# Patient Record
Sex: MALE | Race: WHITE | NOT HISPANIC OR LATINO | Employment: OTHER | URBAN - METROPOLITAN AREA
[De-identification: names, ages, dates, MRNs, and addresses within clinical notes are randomized per-mention and may not be internally consistent; named-entity substitution may affect disease eponyms.]

---

## 2017-08-02 ENCOUNTER — HISTORICAL (OUTPATIENT)
Dept: RADIOLOGY | Facility: HOSPITAL | Age: 62
End: 2017-08-02

## 2017-08-15 ENCOUNTER — HISTORICAL (OUTPATIENT)
Dept: ADMINISTRATIVE | Facility: HOSPITAL | Age: 62
End: 2017-08-15

## 2017-10-10 ENCOUNTER — HISTORICAL (OUTPATIENT)
Dept: LAB | Facility: HOSPITAL | Age: 62
End: 2017-10-10

## 2017-11-02 ENCOUNTER — HISTORICAL (OUTPATIENT)
Dept: ADMINISTRATIVE | Facility: HOSPITAL | Age: 62
End: 2017-11-02

## 2017-11-02 LAB
ABS NEUT (OLG): 6.35 X10(3)/MCL (ref 2.1–9.2)
ALBUMIN SERPL-MCNC: 3.8 GM/DL (ref 3.4–5)
ALBUMIN/GLOB SERPL: 1.3 RATIO (ref 1.1–2)
ALP SERPL-CCNC: 53 UNIT/L (ref 50–136)
ALT SERPL-CCNC: 38 UNIT/L (ref 12–78)
AST SERPL-CCNC: 21 UNIT/L (ref 15–37)
BASOPHILS # BLD AUTO: 0 X10(3)/MCL (ref 0–0.2)
BASOPHILS NFR BLD AUTO: 0.3 %
BILIRUB SERPL-MCNC: 0.8 MG/DL (ref 0.2–1)
BILIRUBIN DIRECT+TOT PNL SERPL-MCNC: 0.2 MG/DL (ref 0–0.5)
BILIRUBIN DIRECT+TOT PNL SERPL-MCNC: 0.6 MG/DL (ref 0–0.8)
BUN SERPL-MCNC: 13 MG/DL (ref 7–18)
CALCIUM SERPL-MCNC: 9.7 MG/DL (ref 8.5–10.1)
CHLORIDE SERPL-SCNC: 104 MMOL/L (ref 98–107)
CO2 SERPL-SCNC: 26 MMOL/L (ref 21–32)
CREAT SERPL-MCNC: 0.83 MG/DL (ref 0.7–1.3)
EOSINOPHIL # BLD AUTO: 0.1 X10(3)/MCL (ref 0–0.9)
EOSINOPHIL NFR BLD AUTO: 1.3 %
ERYTHROCYTE [DISTWIDTH] IN BLOOD BY AUTOMATED COUNT: 19.6 % (ref 11.5–17)
FERRITIN SERPL-MCNC: 561.7 NG/ML (ref 8–388)
GLOBULIN SER-MCNC: 2.9 GM/DL (ref 2.4–3.5)
GLUCOSE SERPL-MCNC: 99 MG/DL (ref 74–106)
HCT VFR BLD AUTO: 41.8 % (ref 42–52)
HGB BLD-MCNC: 13.7 GM/DL (ref 14–18)
IRON SATN MFR SERPL: 17.9 % (ref 20–50)
IRON SERPL-MCNC: 61 MCG/DL (ref 50–175)
LYMPHOCYTES # BLD AUTO: 3.6 X10(3)/MCL (ref 0.6–4.6)
LYMPHOCYTES NFR BLD AUTO: 32.4 %
MCH RBC QN AUTO: 21.4 PG (ref 27–31)
MCHC RBC AUTO-ENTMCNC: 32.8 GM/DL (ref 33–36)
MCV RBC AUTO: 65.3 FL (ref 80–94)
MONOCYTES # BLD AUTO: 0.9 X10(3)/MCL (ref 0.1–1.3)
MONOCYTES NFR BLD AUTO: 8.2 %
NEUTROPHILS # BLD AUTO: 6.4 X10(3)/MCL (ref 2.1–9.2)
NEUTROPHILS NFR BLD AUTO: 57.8 %
PLATELET # BLD AUTO: 215 X10(3)/MCL (ref 130–400)
PMV BLD AUTO: 11.1 FL (ref 9.4–12.4)
POTASSIUM SERPL-SCNC: 3.9 MMOL/L (ref 3.5–5.1)
PROT SERPL-MCNC: 6.7 GM/DL (ref 6.4–8.2)
RBC # BLD AUTO: 6.4 X10(6)/MCL (ref 4.7–6.1)
SODIUM SERPL-SCNC: 139 MMOL/L (ref 136–145)
TIBC SERPL-MCNC: 341 MCG/DL (ref 250–450)
TRANSFERRIN SERPL-MCNC: 268 MG/DL (ref 200–360)
WBC # SPEC AUTO: 11 X10(3)/MCL (ref 4.5–11.5)

## 2017-11-21 ENCOUNTER — HISTORICAL (OUTPATIENT)
Dept: ADMINISTRATIVE | Facility: HOSPITAL | Age: 62
End: 2017-11-21

## 2017-12-14 ENCOUNTER — HISTORICAL (OUTPATIENT)
Dept: ADMINISTRATIVE | Facility: HOSPITAL | Age: 62
End: 2017-12-14

## 2018-12-19 ENCOUNTER — HISTORICAL (OUTPATIENT)
Dept: ADMINISTRATIVE | Facility: HOSPITAL | Age: 63
End: 2018-12-19

## 2018-12-19 LAB
ABS NEUT (OLG): 6.4 X10(3)/MCL (ref 2.1–9.2)
ALBUMIN SERPL-MCNC: 4 GM/DL (ref 3.4–5)
ALBUMIN/GLOB SERPL: 1.74 {RATIO} (ref 1.5–2.5)
ALP SERPL-CCNC: 46 UNIT/L (ref 38–126)
ALT SERPL-CCNC: 25 UNIT/L (ref 7–52)
AST SERPL-CCNC: 18 UNIT/L (ref 15–37)
BILIRUB SERPL-MCNC: 0.6 MG/DL (ref 0.2–1)
BILIRUBIN DIRECT+TOT PNL SERPL-MCNC: 0.1 MG/DL (ref 0–0.5)
BILIRUBIN DIRECT+TOT PNL SERPL-MCNC: 0.5 MG/DL
BUN SERPL-MCNC: 15 MG/DL (ref 7–18)
CALCIUM SERPL-MCNC: 9.8 MG/DL (ref 8.5–10)
CHLORIDE SERPL-SCNC: 103 MMOL/L (ref 98–107)
CO2 SERPL-SCNC: 26 MMOL/L (ref 21–32)
CREAT SERPL-MCNC: 0.89 MG/DL (ref 0.6–1.3)
ERYTHROCYTE [DISTWIDTH] IN BLOOD BY AUTOMATED COUNT: 16.1 % (ref 11.5–17)
GLOBULIN SER-MCNC: 2.3 GM/DL (ref 1.2–3)
GLUCOSE SERPL-MCNC: 330 MG/DL (ref 74–106)
HCT VFR BLD AUTO: 45.8 % (ref 42–52)
HGB BLD-MCNC: 13.7 GM/DL (ref 14–18)
LYMPHOCYTES # BLD AUTO: 4.6 X10(3)/MCL (ref 0.6–3.4)
LYMPHOCYTES NFR BLD AUTO: 38 % (ref 13–40)
MCH RBC QN AUTO: 21.8 PG (ref 27–31.2)
MCHC RBC AUTO-ENTMCNC: 30 GM/DL (ref 32–36)
MCV RBC AUTO: 73 FL (ref 80–94)
MONOCYTES # BLD AUTO: 1 X10(3)/MCL (ref 0.1–1.3)
MONOCYTES NFR BLD AUTO: 8.7 % (ref 0.1–24)
NEUTROPHILS NFR BLD AUTO: 53.3 % (ref 47–80)
PLATELET # BLD AUTO: 246 X10(3)/MCL (ref 130–400)
PMV BLD AUTO: 10.7 FL (ref 9.4–12.4)
POTASSIUM SERPL-SCNC: 4.2 MMOL/L (ref 3.5–5.1)
PROT SERPL-MCNC: 6.3 GM/DL (ref 6.4–8.2)
PSA SERPL-MCNC: 0 NG/ML (ref 0–4.5)
RBC # BLD AUTO: 6.29 X10(6)/MCL (ref 4.7–6.1)
SODIUM SERPL-SCNC: 135 MMOL/L (ref 136–145)
WBC # SPEC AUTO: 12 X10(3)/MCL (ref 4.5–11.5)

## 2018-12-21 LAB
EST. AVERAGE GLUCOSE BLD GHB EST-MCNC: 151 MG/DL
HBA1C MFR BLD: 6.9 % (ref 4.4–6.4)

## 2020-11-11 ENCOUNTER — HISTORICAL (OUTPATIENT)
Dept: ADMINISTRATIVE | Facility: HOSPITAL | Age: 65
End: 2020-11-11

## 2020-12-09 ENCOUNTER — HISTORICAL (OUTPATIENT)
Dept: ADMINISTRATIVE | Facility: HOSPITAL | Age: 65
End: 2020-12-09

## 2020-12-09 LAB
ABS NEUT (OLG): 5.1 X10(3)/MCL (ref 2.1–9.2)
ALBUMIN SERPL-MCNC: 4.3 GM/DL (ref 3.4–5)
ALBUMIN/GLOB SERPL: 2.05 {RATIO} (ref 1.5–2.5)
ALP SERPL-CCNC: 35 UNIT/L (ref 38–126)
ALT SERPL-CCNC: 19 UNIT/L (ref 7–52)
AST SERPL-CCNC: 18 UNIT/L (ref 15–37)
BILIRUB SERPL-MCNC: 0.6 MG/DL (ref 0.2–1)
BILIRUBIN DIRECT+TOT PNL SERPL-MCNC: 0.2 MG/DL (ref 0–0.5)
BILIRUBIN DIRECT+TOT PNL SERPL-MCNC: 0.4 MG/DL
BUN SERPL-MCNC: 18 MG/DL (ref 7–18)
CALCIUM SERPL-MCNC: 10.1 MG/DL (ref 8.5–10.1)
CHLORIDE SERPL-SCNC: 104 MMOL/L (ref 98–107)
CHOLEST SERPL-MCNC: 92 MG/DL (ref 0–200)
CHOLEST/HDLC SERPL: 2.9 {RATIO}
CO2 SERPL-SCNC: 24 MMOL/L (ref 21–32)
CREAT SERPL-MCNC: 0.88 MG/DL (ref 0.6–1.3)
ERYTHROCYTE [DISTWIDTH] IN BLOOD BY AUTOMATED COUNT: 16.2 % (ref 11.5–17)
GLOBULIN SER-MCNC: 2.1 GM/DL (ref 1.2–3)
GLUCOSE SERPL-MCNC: 160 MG/DL (ref 74–106)
HCT VFR BLD AUTO: 42.6 % (ref 42–52)
HDLC SERPL-MCNC: 32 MG/DL (ref 35–60)
HGB BLD-MCNC: 13 GM/DL (ref 14–18)
LDLC SERPL CALC-MCNC: 45 MG/DL (ref 0–129)
LYMPHOCYTES # BLD AUTO: 4.2 X10(3)/MCL (ref 0.6–3.4)
LYMPHOCYTES NFR BLD AUTO: 40.9 % (ref 13–40)
MCH RBC QN AUTO: 20.5 PG (ref 27–31.2)
MCHC RBC AUTO-ENTMCNC: 30 GM/DL (ref 32–36)
MCV RBC AUTO: 67 FL (ref 80–94)
MONOCYTES # BLD AUTO: 0.9 X10(3)/MCL (ref 0.1–1.3)
MONOCYTES NFR BLD AUTO: 8.8 % (ref 0.1–24)
NEUTROPHILS NFR BLD AUTO: 50.3 % (ref 47–80)
PLATELET # BLD AUTO: 207 X10(3)/MCL (ref 130–400)
PMV BLD AUTO: 11 FL (ref 9.4–12.4)
POTASSIUM SERPL-SCNC: 3.9 MMOL/L (ref 3.5–5.1)
PROT SERPL-MCNC: 6.4 GM/DL (ref 6.4–8.2)
PSA SERPL-MCNC: 0 NG/ML (ref 0–4.5)
RBC # BLD AUTO: 6.35 X10(6)/MCL (ref 4.7–6.1)
SODIUM SERPL-SCNC: 137 MMOL/L (ref 136–145)
TRIGL SERPL-MCNC: 143 MG/DL (ref 30–150)
TSH SERPL-ACNC: 1.88 MIU/ML (ref 0.35–4.94)
VLDLC SERPL CALC-MCNC: 28.6 MG/DL
WBC # SPEC AUTO: 10.2 X10(3)/MCL (ref 4.5–11.5)

## 2021-01-04 ENCOUNTER — HISTORICAL (OUTPATIENT)
Dept: RESPIRATORY THERAPY | Facility: HOSPITAL | Age: 66
End: 2021-01-04

## 2021-12-08 ENCOUNTER — HISTORICAL (OUTPATIENT)
Dept: ADMINISTRATIVE | Facility: HOSPITAL | Age: 66
End: 2021-12-08

## 2021-12-08 LAB
ABS NEUT (OLG): 5.6 X10(3)/MCL (ref 2.1–9.2)
ALBUMIN SERPL-MCNC: 4.3 GM/DL (ref 3.4–5)
ALBUMIN/GLOB SERPL: 2.15 {RATIO} (ref 1.5–2.5)
ALP SERPL-CCNC: 34 UNIT/L (ref 38–126)
ALT SERPL-CCNC: 16 UNIT/L (ref 7–52)
APPEARANCE, UA: CLEAR
AST SERPL-CCNC: 21 UNIT/L (ref 15–37)
BACTERIA #/AREA URNS AUTO: ABNORMAL /HPF
BILIRUB SERPL-MCNC: 0.7 MG/DL (ref 0.2–1)
BILIRUB UR QL STRIP: NEGATIVE MG/DL
BILIRUBIN DIRECT+TOT PNL SERPL-MCNC: 0.2 MG/DL (ref 0–0.5)
BILIRUBIN DIRECT+TOT PNL SERPL-MCNC: 0.5 MG/DL
BUN SERPL-MCNC: 19 MG/DL (ref 7–18)
CALCIUM SERPL-MCNC: 10.5 MG/DL (ref 8.5–10.1)
CHLORIDE SERPL-SCNC: 104 MMOL/L (ref 98–107)
CHOLEST SERPL-MCNC: 83 MG/DL (ref 0–200)
CHOLEST/HDLC SERPL: 2.4 {RATIO}
CO2 SERPL-SCNC: 26 MMOL/L (ref 21–32)
COLOR UR: YELLOW
CREAT SERPL-MCNC: 0.71 MG/DL (ref 0.6–1.3)
ERYTHROCYTE [DISTWIDTH] IN BLOOD BY AUTOMATED COUNT: 18.4 % (ref 11.5–17)
EST. AVERAGE GLUCOSE BLD GHB EST-MCNC: 117 MG/DL
GLOBULIN SER-MCNC: 1.8 GM/DL (ref 1.2–3)
GLUCOSE (UA): ABNORMAL MG/DL
GLUCOSE SERPL-MCNC: 108 MG/DL (ref 74–106)
HBA1C MFR BLD: 5.7 % (ref 4.4–6.4)
HCT VFR BLD AUTO: 41.8 % (ref 42–52)
HDLC SERPL-MCNC: 34 MG/DL (ref 35–60)
HGB BLD-MCNC: 12.8 GM/DL (ref 14–18)
HGB UR QL STRIP: NEGATIVE UNIT/L
KETONES UR QL STRIP: ABNORMAL MG/DL
LDLC SERPL CALC-MCNC: 34 MG/DL (ref 0–129)
LEUKOCYTE ESTERASE UR QL STRIP: NEGATIVE UNIT/L
LYMPHOCYTES # BLD AUTO: 4.1 X10(3)/MCL (ref 0.6–3.4)
LYMPHOCYTES NFR BLD AUTO: 37.2 % (ref 13–40)
MCH RBC QN AUTO: 20.8 PG (ref 27–31.2)
MCHC RBC AUTO-ENTMCNC: 31 GM/DL (ref 32–36)
MCV RBC AUTO: 68 FL (ref 80–94)
MONOCYTES # BLD AUTO: 1.3 X10(3)/MCL (ref 0.1–1.3)
MONOCYTES NFR BLD AUTO: 11.7 % (ref 0.1–24)
NEUTROPHILS NFR BLD AUTO: 51.1 % (ref 47–80)
NITRITE UR QL STRIP.AUTO: NEGATIVE
PH UR STRIP: 5.5 [PH]
PLATELET # BLD AUTO: 170 X10(3)/MCL (ref 130–400)
PMV BLD AUTO: 10.9 FL (ref 9.4–12.4)
POTASSIUM SERPL-SCNC: 4.3 MMOL/L (ref 3.5–5.1)
PROT SERPL-MCNC: 6.3 GM/DL (ref 6.4–8.2)
PROT UR QL STRIP: NEGATIVE MG/DL
RBC # BLD AUTO: 6.14 X10(6)/MCL (ref 4.7–6.1)
RBC #/AREA URNS HPF: ABNORMAL /HPF
SODIUM SERPL-SCNC: 140 MMOL/L (ref 136–145)
SP GR UR STRIP: 1.02
SQUAMOUS EPITHELIAL, UA: ABNORMAL /LPF
TRIGL SERPL-MCNC: 85 MG/DL (ref 30–150)
TSH SERPL-ACNC: 2.79 MIU/ML (ref 0.35–4.94)
UROBILINOGEN UR STRIP-ACNC: 0.2 MG/DL
VLDLC SERPL CALC-MCNC: 17 MG/DL
WBC # SPEC AUTO: 11 X10(3)/MCL (ref 4.5–11.5)
WBC #/AREA URNS AUTO: ABNORMAL /[HPF]

## 2022-04-11 ENCOUNTER — HISTORICAL (OUTPATIENT)
Dept: ADMINISTRATIVE | Facility: HOSPITAL | Age: 67
End: 2022-04-11

## 2022-04-25 VITALS
DIASTOLIC BLOOD PRESSURE: 66 MMHG | BODY MASS INDEX: 26.96 KG/M2 | WEIGHT: 199.06 LBS | HEIGHT: 72 IN | SYSTOLIC BLOOD PRESSURE: 112 MMHG | OXYGEN SATURATION: 98 %

## 2022-04-30 NOTE — OP NOTE
Patient:   Alonso Andrade            MRN: [id number]            FIN: [id number]               Age:   62 years     Sex:  Male     :  1955   Associated Diagnoses:   None   Author:   Bang Dos Santos MD       Phacoemulsification of Cataract with Intraocular Implant   Preoperative Diagnosis: Cataract right eye   Postoperative Diagnosis : Cataract right eye  Surgeon: Bang Dos Santos MD  Assistant: CHIKIS Judge  Anestheisa: Topical  Complications: None  After the patient underwent topical anesthesia along with IV sedation in the holding area, the patient was brought to the [facility] laser.  A time out was performed.  The capsulotomy and lens fragmentation were performed.  Then the patient was brought to the operating suite. The patient was prepped and draped in a sterile fashion. A pediatric tegaderm and a lid speculum were used to retract the upper and lower lashes and lids.  A 1.0mm paracentesis was then made at the 11 oclock position. Intraocular non-preserved 1% Xylocaine (4% diluted down to 1%) was irrigated into the anterior chamber. Trypan blue dye was used to stain the anterior capsule then Endocoat was injected into the eye. A clear corneal incision was made with a 2.4 Keratome blade.  A 4.75mm circular capsulotomy was then made with a pre bent 30 gauge needle and BSS was used to hydro dissect the nucleus from the capsule. The nucleus was then phacoemulsified with the Abbott machine for a EFX of 21. The cortex was then removed with the I/A hand piece and Helon was placed into the posterior bag of the eye. An posterior chamber implant [id number] of power 19.0 was placed in the capsular bag. The Helon was then removed from the eye with the I/A hand piece . The anterior chamber was inflated with BSS and the wound was checked for leaks. The lid speculum was removed and a drop of Besivance was placed in the operative eye. The patient was brought to the recovery suite in stable condition.          donald 08/15/2017 @ [facility]

## 2022-04-30 NOTE — OP NOTE
Patient:   Alonso Andrade            MRN: [id number]            FIN: [id number]               Age:   62 years     Sex:  Male     :  1955   Associated Diagnoses:   None   Author:   Bang Dos Santos MD       Phacoemulsification of Cataract with Intraocular Implant   Preoperative Diagnosis: Cataract left eye   Postoperative Diagnosis : Cataract left eye  Surgeon: Bang Dos Santos MD  Assistant: CHIKIS Judge  Anestheisa: Topical  Complications: None    After the patient underwent topical anesthesia along with IV sedation in the holding area, the patient was brought to the [facility] laser.  A time out was performed.  The capsulotomy, lens fragmentation, LRI's at 86 & 266 degrees with a length of 23 degrees and a 2.6mm main incision were completed.  Then the patient was brought to the operating suite. The patient prepped and draped in a sterile fashion. A pediatric tegaderm and a lid speculum were used to retract the upper and lower lashes and lids.  A 1.0mm paracentesis was then made at the 5 oclock and 11 oclock position. Intraocular non-preserved 1% Xylocaine (4% diluted down to 1%) was irrigated into the anterior chamber. Endocoat was injected into the eye. BSS was used to hydro dissect the nucleus from the capsule. The nucleus was then phacoemulsified with the Abbott machine for a EFX of 20. The cortex was then removed with the I/A hand piece and Helon was placed into the posterior bag of the eye. An posterior chamber implant [id number] of power 20.5 was placed in the capsular bag. The Helon was then removed from the eye with the I/A hand piece . The anterior chamber was inflated with BSS and the wound was checked for leaks. The lid speculum was removed and a drop of Besivance was placed in the operative eye. The patient was brought to the recovery suite in stable condition.         2017 @ [facility]

## 2022-06-07 ENCOUNTER — DOCUMENTATION ONLY (OUTPATIENT)
Dept: ADMINISTRATIVE | Facility: HOSPITAL | Age: 67
End: 2022-06-07

## 2022-10-20 PROBLEM — I10 HYPERTENSION: Status: ACTIVE | Noted: 2022-10-20

## 2022-10-20 PROBLEM — E11.9 DIABETES MELLITUS: Status: ACTIVE | Noted: 2022-10-20

## 2022-10-20 PROBLEM — Z85.46 HISTORY OF PROSTATE CANCER: Status: ACTIVE | Noted: 2022-10-20

## 2022-10-20 PROBLEM — N52.9 ED (ERECTILE DYSFUNCTION): Status: ACTIVE | Noted: 2022-10-20

## 2022-10-20 PROBLEM — D56.3 HETEROZYGOUS THALASSEMIA: Status: ACTIVE | Noted: 2022-10-20

## 2022-11-17 ENCOUNTER — HOSPITAL ENCOUNTER (OUTPATIENT)
Dept: RADIOLOGY | Facility: HOSPITAL | Age: 67
Discharge: HOME | End: 2022-11-17
Payer: MEDICARE

## 2022-11-17 DIAGNOSIS — Z79.01 LONG TERM (CURRENT) USE OF ANTICOAGULANTS: ICD-10-CM

## 2022-11-17 DIAGNOSIS — H02.839 DERMATOCHALASIS: Primary | ICD-10-CM

## 2022-11-17 DIAGNOSIS — H02.839 DERMATOCHALASIS: ICD-10-CM

## 2022-11-17 DIAGNOSIS — Z01.818 OTHER SPECIFIED PRE-OPERATIVE EXAMINATION: ICD-10-CM

## 2022-11-17 PROCEDURE — 71046 X-RAY EXAM CHEST 2 VIEWS: CPT | Mod: TC

## 2022-12-19 PROBLEM — Z00.00 MEDICARE ANNUAL WELLNESS VISIT, SUBSEQUENT: Status: ACTIVE | Noted: 2022-12-19

## 2022-12-19 PROBLEM — E78.5 HYPERLIPIDEMIA: Status: ACTIVE | Noted: 2022-12-19

## 2023-01-01 ENCOUNTER — OFFICE VISIT (OUTPATIENT)
Dept: HEMATOLOGY/ONCOLOGY | Facility: CLINIC | Age: 68
End: 2023-01-01
Payer: MEDICARE

## 2023-01-01 ENCOUNTER — INFUSION (OUTPATIENT)
Dept: INFUSION THERAPY | Facility: HOSPITAL | Age: 68
End: 2023-01-01
Attending: INTERNAL MEDICINE
Payer: MEDICARE

## 2023-01-01 ENCOUNTER — PATIENT MESSAGE (OUTPATIENT)
Dept: HEMATOLOGY/ONCOLOGY | Facility: CLINIC | Age: 68
End: 2023-01-01
Payer: MEDICARE

## 2023-01-01 ENCOUNTER — CLINICAL SUPPORT (OUTPATIENT)
Dept: HEMATOLOGY/ONCOLOGY | Facility: CLINIC | Age: 68
End: 2023-01-01
Payer: MEDICARE

## 2023-01-01 ENCOUNTER — PATIENT MESSAGE (OUTPATIENT)
Dept: HEMATOLOGY/ONCOLOGY | Facility: CLINIC | Age: 68
End: 2023-01-01

## 2023-01-01 ENCOUNTER — OFFICE VISIT (OUTPATIENT)
Dept: HEMATOLOGY/ONCOLOGY | Facility: CLINIC | Age: 68
End: 2023-01-01
Attending: INTERNAL MEDICINE
Payer: MEDICARE

## 2023-01-01 ENCOUNTER — ANESTHESIA (OUTPATIENT)
Dept: SURGERY | Facility: HOSPITAL | Age: 68
End: 2023-01-01
Payer: MEDICARE

## 2023-01-01 ENCOUNTER — HOSPITAL ENCOUNTER (OUTPATIENT)
Facility: HOSPITAL | Age: 68
Discharge: HOME | End: 2023-09-28
Attending: INTERNAL MEDICINE | Admitting: INTERNAL MEDICINE
Payer: MEDICARE

## 2023-01-01 ENCOUNTER — LAB VISIT (OUTPATIENT)
Dept: LAB | Facility: HOSPITAL | Age: 68
End: 2023-01-01
Attending: INTERNAL MEDICINE
Payer: MEDICARE

## 2023-01-01 ENCOUNTER — TELEPHONE (OUTPATIENT)
Dept: HEMATOLOGY/ONCOLOGY | Facility: CLINIC | Age: 68
End: 2023-01-01
Payer: MEDICARE

## 2023-01-01 ENCOUNTER — HOSPITAL ENCOUNTER (OUTPATIENT)
Dept: CARDIOLOGY | Facility: HOSPITAL | Age: 68
Discharge: HOME | End: 2023-10-03
Attending: INTERNAL MEDICINE
Payer: MEDICARE

## 2023-01-01 ENCOUNTER — ANESTHESIA EVENT (OUTPATIENT)
Dept: SURGERY | Facility: HOSPITAL | Age: 68
End: 2023-01-01
Payer: MEDICARE

## 2023-01-01 ENCOUNTER — DOCUMENTATION ONLY (OUTPATIENT)
Dept: HEMATOLOGY/ONCOLOGY | Facility: CLINIC | Age: 68
End: 2023-01-01
Payer: MEDICARE

## 2023-01-01 ENCOUNTER — TELEPHONE (OUTPATIENT)
Dept: VASCULAR SURGERY | Facility: CLINIC | Age: 68
End: 2023-01-01
Payer: MEDICARE

## 2023-01-01 ENCOUNTER — HOSPITAL ENCOUNTER (OUTPATIENT)
Facility: HOSPITAL | Age: 68
Discharge: HOME | End: 2023-10-06
Attending: SURGERY | Admitting: SURGERY
Payer: MEDICARE

## 2023-01-01 VITALS
RESPIRATION RATE: 18 BRPM | OXYGEN SATURATION: 99 % | SYSTOLIC BLOOD PRESSURE: 127 MMHG | HEIGHT: 72 IN | HEART RATE: 78 BPM | RESPIRATION RATE: 18 BRPM | TEMPERATURE: 98 F | DIASTOLIC BLOOD PRESSURE: 72 MMHG | BODY MASS INDEX: 22.66 KG/M2 | HEIGHT: 72 IN | SYSTOLIC BLOOD PRESSURE: 131 MMHG | TEMPERATURE: 98 F | HEART RATE: 65 BPM | DIASTOLIC BLOOD PRESSURE: 64 MMHG | WEIGHT: 171.5 LBS | OXYGEN SATURATION: 100 % | WEIGHT: 167.31 LBS | BODY MASS INDEX: 23.23 KG/M2

## 2023-01-01 VITALS
RESPIRATION RATE: 18 BRPM | SYSTOLIC BLOOD PRESSURE: 136 MMHG | WEIGHT: 181 LBS | SYSTOLIC BLOOD PRESSURE: 122 MMHG | BODY MASS INDEX: 24.14 KG/M2 | TEMPERATURE: 98 F | HEART RATE: 62 BPM | TEMPERATURE: 98 F | OXYGEN SATURATION: 98 % | DIASTOLIC BLOOD PRESSURE: 68 MMHG | HEART RATE: 67 BPM | OXYGEN SATURATION: 97 % | WEIGHT: 181.44 LBS | HEART RATE: 57 BPM | DIASTOLIC BLOOD PRESSURE: 75 MMHG | RESPIRATION RATE: 18 BRPM | WEIGHT: 183.81 LBS | WEIGHT: 178.25 LBS | RESPIRATION RATE: 17 BRPM | HEIGHT: 72 IN | BODY MASS INDEX: 24.89 KG/M2 | DIASTOLIC BLOOD PRESSURE: 74 MMHG | OXYGEN SATURATION: 97 % | BODY MASS INDEX: 24.58 KG/M2 | HEIGHT: 72 IN | HEIGHT: 72 IN | SYSTOLIC BLOOD PRESSURE: 135 MMHG | TEMPERATURE: 97 F | OXYGEN SATURATION: 96 % | TEMPERATURE: 98 F | SYSTOLIC BLOOD PRESSURE: 130 MMHG | HEART RATE: 61 BPM | DIASTOLIC BLOOD PRESSURE: 67 MMHG | HEIGHT: 72 IN | BODY MASS INDEX: 24.52 KG/M2

## 2023-01-01 VITALS
HEIGHT: 72 IN | BODY MASS INDEX: 23.23 KG/M2 | SYSTOLIC BLOOD PRESSURE: 115 MMHG | RESPIRATION RATE: 16 BRPM | TEMPERATURE: 98 F | TEMPERATURE: 98 F | SYSTOLIC BLOOD PRESSURE: 135 MMHG | OXYGEN SATURATION: 97 % | OXYGEN SATURATION: 96 % | DIASTOLIC BLOOD PRESSURE: 70 MMHG | HEIGHT: 72 IN | HEART RATE: 61 BPM | DIASTOLIC BLOOD PRESSURE: 66 MMHG | HEART RATE: 64 BPM | WEIGHT: 167.31 LBS | WEIGHT: 171.5 LBS | RESPIRATION RATE: 18 BRPM | BODY MASS INDEX: 22.66 KG/M2

## 2023-01-01 VITALS
TEMPERATURE: 98 F | TEMPERATURE: 98 F | HEART RATE: 76 BPM | OXYGEN SATURATION: 98 % | DIASTOLIC BLOOD PRESSURE: 66 MMHG | RESPIRATION RATE: 16 BRPM | DIASTOLIC BLOOD PRESSURE: 57 MMHG | SYSTOLIC BLOOD PRESSURE: 109 MMHG | HEART RATE: 62 BPM | SYSTOLIC BLOOD PRESSURE: 153 MMHG | HEIGHT: 72 IN | BODY MASS INDEX: 23.23 KG/M2 | WEIGHT: 171.5 LBS | OXYGEN SATURATION: 96 % | RESPIRATION RATE: 18 BRPM

## 2023-01-01 VITALS
DIASTOLIC BLOOD PRESSURE: 69 MMHG | SYSTOLIC BLOOD PRESSURE: 127 MMHG | OXYGEN SATURATION: 98 % | BODY MASS INDEX: 24.14 KG/M2 | HEIGHT: 72 IN | HEART RATE: 66 BPM | RESPIRATION RATE: 18 BRPM | TEMPERATURE: 97 F | SYSTOLIC BLOOD PRESSURE: 139 MMHG | TEMPERATURE: 100 F | WEIGHT: 178.25 LBS | DIASTOLIC BLOOD PRESSURE: 71 MMHG | RESPIRATION RATE: 16 BRPM | HEART RATE: 61 BPM

## 2023-01-01 VITALS
HEART RATE: 82 BPM | HEIGHT: 72 IN | DIASTOLIC BLOOD PRESSURE: 68 MMHG | SYSTOLIC BLOOD PRESSURE: 137 MMHG | WEIGHT: 170.5 LBS | BODY MASS INDEX: 23.09 KG/M2 | OXYGEN SATURATION: 97 % | RESPIRATION RATE: 18 BRPM

## 2023-01-01 VITALS
SYSTOLIC BLOOD PRESSURE: 146 MMHG | OXYGEN SATURATION: 95 % | TEMPERATURE: 97 F | HEART RATE: 55 BPM | HEIGHT: 72 IN | BODY MASS INDEX: 24.81 KG/M2 | DIASTOLIC BLOOD PRESSURE: 74 MMHG | RESPIRATION RATE: 18 BRPM | WEIGHT: 183.19 LBS

## 2023-01-01 VITALS
DIASTOLIC BLOOD PRESSURE: 71 MMHG | TEMPERATURE: 99 F | RESPIRATION RATE: 18 BRPM | SYSTOLIC BLOOD PRESSURE: 123 MMHG | HEART RATE: 65 BPM

## 2023-01-01 VITALS
DIASTOLIC BLOOD PRESSURE: 80 MMHG | WEIGHT: 183 LBS | OXYGEN SATURATION: 99 % | RESPIRATION RATE: 18 BRPM | HEIGHT: 72 IN | SYSTOLIC BLOOD PRESSURE: 119 MMHG | HEART RATE: 72 BPM | BODY MASS INDEX: 24.79 KG/M2

## 2023-01-01 VITALS — WEIGHT: 178.25 LBS | BODY MASS INDEX: 24.14 KG/M2 | HEIGHT: 72 IN

## 2023-01-01 DIAGNOSIS — C25.2 MALIGNANT NEOPLASM OF TAIL OF PANCREAS: Primary | ICD-10-CM

## 2023-01-01 DIAGNOSIS — C25.7 MALIGNANT NEOPLASM OF OTHER PARTS OF PANCREAS: Primary | ICD-10-CM

## 2023-01-01 DIAGNOSIS — C25.2 MALIGNANT NEOPLASM OF TAIL OF PANCREAS: ICD-10-CM

## 2023-01-01 DIAGNOSIS — C25.7 MALIGNANT NEOPLASM OF OTHER PARTS OF PANCREAS: ICD-10-CM

## 2023-01-01 DIAGNOSIS — C25.9 MALIGNANT NEOPLASM OF PANCREAS, UNSPECIFIED LOCATION OF MALIGNANCY: Primary | ICD-10-CM

## 2023-01-01 DIAGNOSIS — C78.7 SECONDARY MALIGNANT NEOPLASM OF LIVER: ICD-10-CM

## 2023-01-01 DIAGNOSIS — I82.403 DEEP VEIN THROMBOSIS (DVT) OF BOTH LOWER EXTREMITIES, UNSPECIFIED CHRONICITY, UNSPECIFIED VEIN: ICD-10-CM

## 2023-01-01 DIAGNOSIS — R93.5 ABNORMAL FINDINGS ON DIAGNOSTIC IMAGING OF ABDOMEN: ICD-10-CM

## 2023-01-01 DIAGNOSIS — C25.9 PANCREATIC CANCER: Primary | ICD-10-CM

## 2023-01-01 DIAGNOSIS — R60.9 SWELLING: ICD-10-CM

## 2023-01-01 DIAGNOSIS — M79.605 PAIN IN BOTH LOWER EXTREMITIES: ICD-10-CM

## 2023-01-01 DIAGNOSIS — M79.604 PAIN IN BOTH LOWER EXTREMITIES: ICD-10-CM

## 2023-01-01 DIAGNOSIS — Z85.46 HISTORY OF PROSTATE CANCER: Primary | ICD-10-CM

## 2023-01-01 DIAGNOSIS — C25.9 PANCREATIC CANCER: ICD-10-CM

## 2023-01-01 DIAGNOSIS — C25.9 MALIGNANT NEOPLASM OF PANCREAS, UNSPECIFIED LOCATION OF MALIGNANCY: ICD-10-CM

## 2023-01-01 DIAGNOSIS — T45.1X5A CHEMOTHERAPY-INDUCED NAUSEA: Primary | ICD-10-CM

## 2023-01-01 DIAGNOSIS — Z80.0 FAMILY HISTORY OF COLON CANCER: ICD-10-CM

## 2023-01-01 DIAGNOSIS — C78.7 SECONDARY MALIGNANT NEOPLASM OF LIVER: Primary | ICD-10-CM

## 2023-01-01 DIAGNOSIS — R11.0 CHEMOTHERAPY-INDUCED NAUSEA: Primary | ICD-10-CM

## 2023-01-01 DIAGNOSIS — G89.3 CANCER RELATED PAIN: ICD-10-CM

## 2023-01-01 DIAGNOSIS — R60.9 SWELLING: Primary | ICD-10-CM

## 2023-01-01 DIAGNOSIS — R19.09 OTHER INTRA-ABDOMINAL AND PELVIC SWELLING, MASS AND LUMP: ICD-10-CM

## 2023-01-01 DIAGNOSIS — I82.403 DEEP VEIN THROMBOSIS (DVT) OF BOTH LOWER EXTREMITIES, UNSPECIFIED CHRONICITY, UNSPECIFIED VEIN: Primary | ICD-10-CM

## 2023-01-01 LAB
ALBUMIN SERPL-MCNC: 2.7 G/DL (ref 3.4–4.8)
ALBUMIN SERPL-MCNC: 3.6 G/DL (ref 3.4–4.8)
ALBUMIN SERPL-MCNC: 3.8 G/DL (ref 3.4–4.8)
ALBUMIN/GLOB SERPL: 0.8 RATIO (ref 1.1–2)
ALBUMIN/GLOB SERPL: 1.2 RATIO (ref 1.1–2)
ALBUMIN/GLOB SERPL: 1.3 RATIO (ref 1.1–2)
ALP SERPL-CCNC: 1048 UNIT/L (ref 40–150)
ALP SERPL-CCNC: 264 UNIT/L (ref 40–150)
ALP SERPL-CCNC: 391 UNIT/L (ref 40–150)
ALT SERPL-CCNC: 160 UNIT/L (ref 0–55)
ALT SERPL-CCNC: 45 UNIT/L (ref 0–55)
ALT SERPL-CCNC: 65 UNIT/L (ref 0–55)
AST SERPL-CCNC: 201 UNIT/L (ref 5–34)
AST SERPL-CCNC: 37 UNIT/L (ref 5–34)
AST SERPL-CCNC: 46 UNIT/L (ref 5–34)
BASOPHILS # BLD AUTO: 0.04 X10(3)/MCL
BASOPHILS # BLD AUTO: 0.04 X10(3)/MCL
BASOPHILS # BLD AUTO: 0.1 X10(3)/MCL
BASOPHILS NFR BLD AUTO: 0.2 %
BASOPHILS NFR BLD AUTO: 0.3 %
BASOPHILS NFR BLD AUTO: 0.7 %
BILIRUB SERPL-MCNC: 1.2 MG/DL
BILIRUB SERPL-MCNC: 1.2 MG/DL
BILIRUB SERPL-MCNC: 11.5 MG/DL
BUN SERPL-MCNC: 14.5 MG/DL (ref 8.4–25.7)
BUN SERPL-MCNC: 15.5 MG/DL (ref 8.4–25.7)
BUN SERPL-MCNC: 29.3 MG/DL (ref 8.4–25.7)
CALCIUM SERPL-MCNC: 10.3 MG/DL (ref 8.8–10)
CALCIUM SERPL-MCNC: 10.5 MG/DL (ref 8.8–10)
CALCIUM SERPL-MCNC: 10.6 MG/DL (ref 8.8–10)
CANCER AG19-9 SERPL-ACNC: ABNORMAL UNIT/ML (ref 0–37)
CHLORIDE SERPL-SCNC: 103 MMOL/L (ref 98–107)
CHLORIDE SERPL-SCNC: 103 MMOL/L (ref 98–107)
CHLORIDE SERPL-SCNC: 107 MMOL/L (ref 98–107)
CO2 SERPL-SCNC: 23 MMOL/L (ref 23–31)
CO2 SERPL-SCNC: 25 MMOL/L (ref 23–31)
CO2 SERPL-SCNC: 26 MMOL/L (ref 23–31)
CREAT SERPL-MCNC: 0.91 MG/DL (ref 0.73–1.18)
CREAT SERPL-MCNC: 0.93 MG/DL (ref 0.73–1.18)
CREAT SERPL-MCNC: 0.99 MG/DL (ref 0.73–1.18)
EOSINOPHIL # BLD AUTO: 0.01 X10(3)/MCL (ref 0–0.9)
EOSINOPHIL # BLD AUTO: 0.18 X10(3)/MCL (ref 0–0.9)
EOSINOPHIL # BLD AUTO: 0.23 X10(3)/MCL (ref 0–0.9)
EOSINOPHIL NFR BLD AUTO: 0 %
EOSINOPHIL NFR BLD AUTO: 1.2 %
EOSINOPHIL NFR BLD AUTO: 1.8 %
ERYTHROCYTE [DISTWIDTH] IN BLOOD BY AUTOMATED COUNT: 17 % (ref 11.5–17)
ERYTHROCYTE [DISTWIDTH] IN BLOOD BY AUTOMATED COUNT: 17.7 % (ref 11.5–17)
ERYTHROCYTE [DISTWIDTH] IN BLOOD BY AUTOMATED COUNT: 22.9 % (ref 11.5–17)
GFR SERPLBLD CREATININE-BSD FMLA CKD-EPI: >60 MLS/MIN/1.73/M2
GLOBULIN SER-MCNC: 3 GM/DL (ref 2.4–3.5)
GLOBULIN SER-MCNC: 3.1 GM/DL (ref 2.4–3.5)
GLOBULIN SER-MCNC: 3.2 GM/DL (ref 2.4–3.5)
GLUCOSE SERPL-MCNC: 126 MG/DL (ref 82–115)
GLUCOSE SERPL-MCNC: 149 MG/DL (ref 82–115)
GLUCOSE SERPL-MCNC: 274 MG/DL (ref 82–115)
HCT VFR BLD AUTO: 31.7 % (ref 42–52)
HCT VFR BLD AUTO: 37.4 % (ref 42–52)
HCT VFR BLD AUTO: 39.7 % (ref 42–52)
HGB BLD-MCNC: 10.3 G/DL (ref 14–18)
HGB BLD-MCNC: 11.7 G/DL (ref 14–18)
HGB BLD-MCNC: 11.7 G/DL (ref 14–18)
IMM GRANULOCYTES # BLD AUTO: 0.14 X10(3)/MCL (ref 0–0.04)
IMM GRANULOCYTES # BLD AUTO: 0.17 X10(3)/MCL (ref 0–0.04)
IMM GRANULOCYTES # BLD AUTO: 0.34 X10(3)/MCL (ref 0–0.04)
IMM GRANULOCYTES NFR BLD AUTO: 1.1 %
IMM GRANULOCYTES NFR BLD AUTO: 1.2 %
IMM GRANULOCYTES NFR BLD AUTO: 1.6 %
INR PPP: 1.2
LYMPHOCYTES # BLD AUTO: 2.31 X10(3)/MCL (ref 0.6–4.6)
LYMPHOCYTES # BLD AUTO: 3.6 X10(3)/MCL (ref 0.6–4.6)
LYMPHOCYTES # BLD AUTO: 3.83 X10(3)/MCL (ref 0.6–4.6)
LYMPHOCYTES NFR BLD AUTO: 10.8 %
LYMPHOCYTES NFR BLD AUTO: 26.3 %
LYMPHOCYTES NFR BLD AUTO: 27.7 %
MCH RBC QN AUTO: 19 PG (ref 27–31)
MCH RBC QN AUTO: 19.9 PG (ref 27–31)
MCH RBC QN AUTO: 21.1 PG (ref 27–31)
MCHC RBC AUTO-ENTMCNC: 29.5 G/DL (ref 33–36)
MCHC RBC AUTO-ENTMCNC: 31.3 G/DL (ref 33–36)
MCHC RBC AUTO-ENTMCNC: 32.5 G/DL (ref 33–36)
MCV RBC AUTO: 63.5 FL (ref 80–94)
MCV RBC AUTO: 64.6 FL (ref 80–94)
MCV RBC AUTO: 65 FL (ref 80–94)
MONOCYTES # BLD AUTO: 0.91 X10(3)/MCL (ref 0.1–1.3)
MONOCYTES # BLD AUTO: 0.92 X10(3)/MCL (ref 0.1–1.3)
MONOCYTES # BLD AUTO: 1.26 X10(3)/MCL (ref 0.1–1.3)
MONOCYTES NFR BLD AUTO: 4.3 %
MONOCYTES NFR BLD AUTO: 7 %
MONOCYTES NFR BLD AUTO: 8.7 %
NEUTROPHILS # BLD AUTO: 17.71 X10(3)/MCL (ref 2.1–9.2)
NEUTROPHILS # BLD AUTO: 8.07 X10(3)/MCL (ref 2.1–9.2)
NEUTROPHILS # BLD AUTO: 9.02 X10(3)/MCL (ref 2.1–9.2)
NEUTROPHILS NFR BLD AUTO: 61.9 %
NEUTROPHILS NFR BLD AUTO: 62.1 %
NEUTROPHILS NFR BLD AUTO: 83.1 %
NRBC BLD AUTO-RTO: 0 %
ONEOME COMMENT: NORMAL
ONEOME METHOD: NORMAL
PLATELET # BLD AUTO: 213 X10(3)/MCL (ref 130–400)
PLATELET # BLD AUTO: 276 X10(3)/MCL (ref 130–400)
PLATELET # BLD AUTO: 359 X10(3)/MCL (ref 130–400)
PMV BLD AUTO: 11.2 FL (ref 7.4–10.4)
PMV BLD AUTO: 11.2 FL (ref 7.4–10.4)
PMV BLD AUTO: ABNORMAL FL
POCT GLUCOSE: 112 MG/DL (ref 70–110)
POCT GLUCOSE: 116 MG/DL (ref 70–110)
POCT GLUCOSE: 121 MG/DL (ref 70–110)
POTASSIUM SERPL-SCNC: 4.3 MMOL/L (ref 3.5–5.1)
POTASSIUM SERPL-SCNC: 4.5 MMOL/L (ref 3.5–5.1)
POTASSIUM SERPL-SCNC: 5.4 MMOL/L (ref 3.5–5.1)
PROT SERPL-MCNC: 5.9 GM/DL (ref 5.8–7.6)
PROT SERPL-MCNC: 6.7 GM/DL (ref 5.8–7.6)
PROT SERPL-MCNC: 6.8 GM/DL (ref 5.8–7.6)
PROTHROMBIN TIME: 15.3 SECONDS (ref 12.5–14.5)
PSYCHE PATHOLOGY RESULT: NORMAL
RBC # BLD AUTO: 4.88 X10(6)/MCL (ref 4.7–6.1)
RBC # BLD AUTO: 5.89 X10(6)/MCL (ref 4.7–6.1)
RBC # BLD AUTO: 6.15 X10(6)/MCL (ref 4.7–6.1)
SODIUM SERPL-SCNC: 136 MMOL/L (ref 136–145)
SODIUM SERPL-SCNC: 136 MMOL/L (ref 136–145)
SODIUM SERPL-SCNC: 140 MMOL/L (ref 136–145)
WBC # SPEC AUTO: 12.99 X10(3)/MCL (ref 4.5–11.5)
WBC # SPEC AUTO: 14.56 X10(3)/MCL (ref 4.5–11.5)
WBC # SPEC AUTO: 21.33 X10(3)/MCL (ref 4.5–11.5)

## 2023-01-01 PROCEDURE — 63600175 PHARM REV CODE 636 W HCPCS: Mod: JZ,JG | Performed by: INTERNAL MEDICINE

## 2023-01-01 PROCEDURE — 63600175 PHARM REV CODE 636 W HCPCS: Performed by: ANESTHESIOLOGY

## 2023-01-01 PROCEDURE — C1894 INTRO/SHEATH, NON-LASER: HCPCS | Performed by: SURGERY

## 2023-01-01 PROCEDURE — 99999 PR PBB SHADOW E&M-EST. PATIENT-LVL V: CPT | Mod: PBBFAC,,,

## 2023-01-01 PROCEDURE — 36415 COLL VENOUS BLD VENIPUNCTURE: CPT

## 2023-01-01 PROCEDURE — C1726 CATH, BAL DIL, NON-VASCULAR: HCPCS | Performed by: INTERNAL MEDICINE

## 2023-01-01 PROCEDURE — 96413 CHEMO IV INFUSION 1 HR: CPT

## 2023-01-01 PROCEDURE — 63600175 PHARM REV CODE 636 W HCPCS: Performed by: INTERNAL MEDICINE

## 2023-01-01 PROCEDURE — 25000003 PHARM REV CODE 250: Performed by: INTERNAL MEDICINE

## 2023-01-01 PROCEDURE — 99215 PR OFFICE/OUTPT VISIT, EST, LEVL V, 40-54 MIN: ICD-10-PCS | Mod: S$PBB,,,

## 2023-01-01 PROCEDURE — 71000033 HC RECOVERY, INTIAL HOUR: Performed by: SURGERY

## 2023-01-01 PROCEDURE — 99215 OFFICE O/P EST HI 40 MIN: CPT | Mod: 95,,, | Performed by: NURSE PRACTITIONER

## 2023-01-01 PROCEDURE — 36000707: Performed by: SURGERY

## 2023-01-01 PROCEDURE — 25000003 PHARM REV CODE 250: Performed by: NURSE ANESTHETIST, CERTIFIED REGISTERED

## 2023-01-01 PROCEDURE — 99215 OFFICE O/P EST HI 40 MIN: CPT | Mod: PBBFAC | Performed by: INTERNAL MEDICINE

## 2023-01-01 PROCEDURE — 99211 OFF/OP EST MAY X REQ PHY/QHP: CPT | Mod: PBBFAC,27

## 2023-01-01 PROCEDURE — 96416 CHEMO PROLONG INFUSE W/PUMP: CPT

## 2023-01-01 PROCEDURE — 86301 IMMUNOASSAY TUMOR CA 19-9: CPT | Performed by: INTERNAL MEDICINE

## 2023-01-01 PROCEDURE — 96366 THER/PROPH/DIAG IV INF ADDON: CPT

## 2023-01-01 PROCEDURE — D9220A PRA ANESTHESIA: ICD-10-PCS | Mod: ,,, | Performed by: NURSE ANESTHETIST, CERTIFIED REGISTERED

## 2023-01-01 PROCEDURE — 99213 PR OFFICE/OUTPT VISIT, EST, LEVL III, 20-29 MIN: ICD-10-PCS | Mod: NDTC,,, | Performed by: NURSE PRACTITIONER

## 2023-01-01 PROCEDURE — 96415 CHEMO IV INFUSION ADDL HR: CPT

## 2023-01-01 PROCEDURE — D9220A PRA ANESTHESIA: Mod: CRNA,,, | Performed by: REGISTERED NURSE

## 2023-01-01 PROCEDURE — 99999 PR PBB SHADOW E&M-EST. PATIENT-LVL V: CPT | Mod: PBBFAC,,, | Performed by: INTERNAL MEDICINE

## 2023-01-01 PROCEDURE — D9220A PRA ANESTHESIA: Mod: ,,, | Performed by: NURSE ANESTHETIST, CERTIFIED REGISTERED

## 2023-01-01 PROCEDURE — 36561 INSERT TUNNELED CV CATH: CPT | Mod: RT,,, | Performed by: SURGERY

## 2023-01-01 PROCEDURE — 96375 TX/PRO/DX INJ NEW DRUG ADDON: CPT

## 2023-01-01 PROCEDURE — 36415 COLL VENOUS BLD VENIPUNCTURE: CPT | Performed by: INTERNAL MEDICINE

## 2023-01-01 PROCEDURE — 37000009 HC ANESTHESIA EA ADD 15 MINS: Performed by: SURGERY

## 2023-01-01 PROCEDURE — 80053 COMPREHEN METABOLIC PANEL: CPT

## 2023-01-01 PROCEDURE — 99215 OFFICE O/P EST HI 40 MIN: CPT | Mod: S$PBB,,, | Performed by: INTERNAL MEDICINE

## 2023-01-01 PROCEDURE — 96372 THER/PROPH/DIAG INJ SC/IM: CPT

## 2023-01-01 PROCEDURE — 96367 TX/PROPH/DG ADDL SEQ IV INF: CPT

## 2023-01-01 PROCEDURE — 99215 OFFICE O/P EST HI 40 MIN: CPT | Mod: PBBFAC,25 | Performed by: INTERNAL MEDICINE

## 2023-01-01 PROCEDURE — 96368 THER/DIAG CONCURRENT INF: CPT

## 2023-01-01 PROCEDURE — 36000706: Performed by: SURGERY

## 2023-01-01 PROCEDURE — 99205 OFFICE O/P NEW HI 60 MIN: CPT | Mod: S$PBB,,, | Performed by: INTERNAL MEDICINE

## 2023-01-01 PROCEDURE — 37000008 HC ANESTHESIA 1ST 15 MINUTES: Performed by: INTERNAL MEDICINE

## 2023-01-01 PROCEDURE — 88305 TISSUE EXAM BY PATHOLOGIST: CPT | Mod: 59 | Performed by: INTERNAL MEDICINE

## 2023-01-01 PROCEDURE — 88172 CYTP DX EVAL FNA 1ST EA SITE: CPT | Mod: 59

## 2023-01-01 PROCEDURE — 96417 CHEMO IV INFUS EACH ADDL SEQ: CPT

## 2023-01-01 PROCEDURE — 99999 PR PBB SHADOW E&M-EST. PATIENT-LVL V: ICD-10-PCS | Mod: PBBFAC,,, | Performed by: INTERNAL MEDICINE

## 2023-01-01 PROCEDURE — 99215 PR OFFICE/OUTPT VISIT, EST, LEVL V, 40-54 MIN: ICD-10-PCS | Mod: S$PBB,,, | Performed by: INTERNAL MEDICINE

## 2023-01-01 PROCEDURE — 99215 PR OFFICE/OUTPT VISIT, EST, LEVL V, 40-54 MIN: ICD-10-PCS | Mod: 95,,, | Performed by: NURSE PRACTITIONER

## 2023-01-01 PROCEDURE — 88173 CYTOPATH EVAL FNA REPORT: CPT | Mod: 59

## 2023-01-01 PROCEDURE — 99215 OFFICE O/P EST HI 40 MIN: CPT | Mod: PBBFAC

## 2023-01-01 PROCEDURE — 37000009 HC ANESTHESIA EA ADD 15 MINS: Performed by: INTERNAL MEDICINE

## 2023-01-01 PROCEDURE — 96365 THER/PROPH/DIAG IV INF INIT: CPT

## 2023-01-01 PROCEDURE — 99211 OFF/OP EST MAY X REQ PHY/QHP: CPT | Mod: PBBFAC,25,27

## 2023-01-01 PROCEDURE — D9220A PRA ANESTHESIA: ICD-10-PCS | Mod: CRNA,,, | Performed by: REGISTERED NURSE

## 2023-01-01 PROCEDURE — D9220A PRA ANESTHESIA: ICD-10-PCS | Mod: ANES,,, | Performed by: ANESTHESIOLOGY

## 2023-01-01 PROCEDURE — 99205 PR OFFICE/OUTPT VISIT, NEW, LEVL V, 60-74 MIN: ICD-10-PCS | Mod: S$PBB,,, | Performed by: INTERNAL MEDICINE

## 2023-01-01 PROCEDURE — 27201423 OPTIME MED/SURG SUP & DEVICES STERILE SUPPLY: Performed by: INTERNAL MEDICINE

## 2023-01-01 PROCEDURE — 99999 PR PBB SHADOW E&M-EST. PATIENT-LVL I: CPT | Mod: PBBFAC,,,

## 2023-01-01 PROCEDURE — D9220A PRA ANESTHESIA: Mod: ANES,,, | Performed by: ANESTHESIOLOGY

## 2023-01-01 PROCEDURE — 37000008 HC ANESTHESIA 1ST 15 MINUTES: Performed by: SURGERY

## 2023-01-01 PROCEDURE — 63600175 PHARM REV CODE 636 W HCPCS: Performed by: NURSE ANESTHETIST, CERTIFIED REGISTERED

## 2023-01-01 PROCEDURE — 71000015 HC POSTOP RECOV 1ST HR: Performed by: SURGERY

## 2023-01-01 PROCEDURE — 63600175 PHARM REV CODE 636 W HCPCS: Performed by: SURGERY

## 2023-01-01 PROCEDURE — 80053 COMPREHEN METABOLIC PANEL: CPT | Performed by: INTERNAL MEDICINE

## 2023-01-01 PROCEDURE — 85610 PROTHROMBIN TIME: CPT | Performed by: INTERNAL MEDICINE

## 2023-01-01 PROCEDURE — C1788 PORT, INDWELLING, IMP: HCPCS | Performed by: SURGERY

## 2023-01-01 PROCEDURE — 85025 COMPLETE CBC W/AUTO DIFF WBC: CPT | Performed by: INTERNAL MEDICINE

## 2023-01-01 PROCEDURE — 63600175 PHARM REV CODE 636 W HCPCS: Performed by: REGISTERED NURSE

## 2023-01-01 PROCEDURE — 99999 PR PBB SHADOW E&M-EST. PATIENT-LVL I: ICD-10-PCS | Mod: PBBFAC,,,

## 2023-01-01 PROCEDURE — 77001 FLUOROGUIDE FOR VEIN DEVICE: CPT | Mod: 26,,, | Performed by: SURGERY

## 2023-01-01 PROCEDURE — 99215 OFFICE O/P EST HI 40 MIN: CPT | Mod: S$PBB,,,

## 2023-01-01 PROCEDURE — 36561 PR INSERT TUNNELED CV CATH WITH PORT: ICD-10-PCS | Mod: RT,,, | Performed by: SURGERY

## 2023-01-01 PROCEDURE — 99213 OFFICE O/P EST LOW 20 MIN: CPT | Mod: NDTC,,, | Performed by: NURSE PRACTITIONER

## 2023-01-01 PROCEDURE — 43242 EGD US FINE NEEDLE BX/ASPIR: CPT | Performed by: INTERNAL MEDICINE

## 2023-01-01 PROCEDURE — 77001 CHG FLUOROGUIDE CNTRL VEN ACCESS,PLACE,REPLACE,REMOVE: ICD-10-PCS | Mod: 26,,, | Performed by: SURGERY

## 2023-01-01 PROCEDURE — 25000003 PHARM REV CODE 250: Performed by: REGISTERED NURSE

## 2023-01-01 PROCEDURE — 86301 IMMUNOASSAY TUMOR CA 19-9: CPT

## 2023-01-01 PROCEDURE — 93970 EXTREMITY STUDY: CPT | Mod: TC

## 2023-01-01 PROCEDURE — 85025 COMPLETE CBC W/AUTO DIFF WBC: CPT

## 2023-01-01 PROCEDURE — 85060 BLOOD SMEAR INTERPRETATION: CPT | Performed by: NURSE PRACTITIONER

## 2023-01-01 PROCEDURE — 99999 PR PBB SHADOW E&M-EST. PATIENT-LVL V: ICD-10-PCS | Mod: PBBFAC,,,

## 2023-01-01 PROCEDURE — A4216 STERILE WATER/SALINE, 10 ML: HCPCS | Performed by: INTERNAL MEDICINE

## 2023-01-01 DEVICE — PORT POWER CLEAR VIEW: Type: IMPLANTABLE DEVICE | Site: CHEST | Status: FUNCTIONAL

## 2023-01-01 RX ORDER — EPINEPHRINE 0.3 MG/.3ML
0.3 INJECTION SUBCUTANEOUS ONCE AS NEEDED
Status: DISCONTINUED | OUTPATIENT
Start: 2023-01-01 | End: 2023-01-01 | Stop reason: HOSPADM

## 2023-01-01 RX ORDER — SODIUM CHLORIDE 0.9 % (FLUSH) 0.9 %
10 SYRINGE (ML) INJECTION
Status: CANCELLED | OUTPATIENT
Start: 2023-01-01

## 2023-01-01 RX ORDER — TRAMADOL HYDROCHLORIDE 50 MG/1
50 TABLET ORAL EVERY 6 HOURS PRN
Qty: 120 TABLET | Refills: 0 | Status: CANCELLED | OUTPATIENT
Start: 2023-01-01

## 2023-01-01 RX ORDER — FLUOROURACIL 50 MG/ML
2400 INJECTION, SOLUTION INTRAVENOUS
Status: CANCELLED | OUTPATIENT
Start: 2023-01-01

## 2023-01-01 RX ORDER — ATROPINE SULFATE 0.4 MG/ML
0.4 INJECTION, SOLUTION ENDOTRACHEAL; INTRAMEDULLARY; INTRAMUSCULAR; INTRAVENOUS; SUBCUTANEOUS ONCE AS NEEDED
Status: COMPLETED | OUTPATIENT
Start: 2023-01-01 | End: 2023-01-01

## 2023-01-01 RX ORDER — ONDANSETRON 2 MG/ML
4 INJECTION INTRAMUSCULAR; INTRAVENOUS ONCE
Status: CANCELLED | OUTPATIENT
Start: 2023-01-01 | End: 2023-01-01

## 2023-01-01 RX ORDER — METHOCARBAMOL 100 MG/ML
1000 INJECTION, SOLUTION INTRAMUSCULAR; INTRAVENOUS ONCE
Status: COMPLETED | OUTPATIENT
Start: 2023-01-01 | End: 2023-01-01

## 2023-01-01 RX ORDER — DIPHENHYDRAMINE HYDROCHLORIDE 50 MG/ML
50 INJECTION INTRAMUSCULAR; INTRAVENOUS ONCE AS NEEDED
Status: CANCELLED | OUTPATIENT
Start: 2023-01-01

## 2023-01-01 RX ORDER — CELECOXIB 200 MG/1
CAPSULE ORAL 2 TIMES DAILY
COMMUNITY
Start: 2023-01-01

## 2023-01-01 RX ORDER — HEPARIN 100 UNIT/ML
500 SYRINGE INTRAVENOUS
Status: CANCELLED | OUTPATIENT
Start: 2023-01-01

## 2023-01-01 RX ORDER — RIVAROXABAN 15 MG-20MG
1 KIT ORAL SEE ADMIN INSTRUCTIONS
Qty: 1 EACH | Refills: 0 | Status: SHIPPED | OUTPATIENT
Start: 2023-01-01 | End: 2023-01-01

## 2023-01-01 RX ORDER — ONDANSETRON 8 MG/1
8 TABLET, ORALLY DISINTEGRATING ORAL EVERY 8 HOURS PRN
Qty: 60 TABLET | Refills: 5 | Status: SHIPPED | OUTPATIENT
Start: 2023-01-01

## 2023-01-01 RX ORDER — SODIUM CHLORIDE 9 MG/ML
INJECTION, SOLUTION INTRAVENOUS CONTINUOUS
Status: CANCELLED | OUTPATIENT
Start: 2023-01-01

## 2023-01-01 RX ORDER — SODIUM CHLORIDE 0.9 % (FLUSH) 0.9 %
10 SYRINGE (ML) INJECTION
Status: DISCONTINUED | OUTPATIENT
Start: 2023-01-01 | End: 2023-01-01 | Stop reason: HOSPADM

## 2023-01-01 RX ORDER — CEFAZOLIN SODIUM 1 G/3ML
2 INJECTION, POWDER, FOR SOLUTION INTRAMUSCULAR; INTRAVENOUS ONCE
Status: COMPLETED | OUTPATIENT
Start: 2023-01-01 | End: 2023-01-01

## 2023-01-01 RX ORDER — FAMOTIDINE 10 MG/ML
20 INJECTION INTRAVENOUS ONCE
Status: CANCELLED | OUTPATIENT
Start: 2023-01-01 | End: 2023-01-01

## 2023-01-01 RX ORDER — DIPHENHYDRAMINE HYDROCHLORIDE 50 MG/ML
25 INJECTION INTRAMUSCULAR; INTRAVENOUS EVERY 6 HOURS PRN
Status: CANCELLED | OUTPATIENT
Start: 2023-01-01

## 2023-01-01 RX ORDER — METOCLOPRAMIDE HYDROCHLORIDE 5 MG/ML
10 INJECTION INTRAMUSCULAR; INTRAVENOUS EVERY 10 MIN PRN
Status: CANCELLED | OUTPATIENT
Start: 2023-01-01

## 2023-01-01 RX ORDER — HEPARIN 100 UNIT/ML
500 SYRINGE INTRAVENOUS
Status: DISCONTINUED | OUTPATIENT
Start: 2023-01-01 | End: 2023-01-01 | Stop reason: HOSPADM

## 2023-01-01 RX ORDER — CYANOCOBALAMIN (VITAMIN B-12) 2000 MCG
TABLET ORAL DAILY
COMMUNITY
End: 2023-01-01

## 2023-01-01 RX ORDER — HEPARIN SODIUM 5000 [USP'U]/ML
INJECTION, SOLUTION INTRAVENOUS; SUBCUTANEOUS
Status: DISCONTINUED | OUTPATIENT
Start: 2023-01-01 | End: 2023-01-01 | Stop reason: HOSPADM

## 2023-01-01 RX ORDER — ACETAMINOPHEN 500 MG
30 TABLET ORAL
COMMUNITY

## 2023-01-01 RX ORDER — ENOXAPARIN SODIUM 100 MG/ML
80 INJECTION SUBCUTANEOUS EVERY 12 HOURS
Qty: 9.6 ML | Refills: 0 | Status: SHIPPED | OUTPATIENT
Start: 2023-01-01 | End: 2023-01-01

## 2023-01-01 RX ORDER — MIDAZOLAM HYDROCHLORIDE 1 MG/ML
2 INJECTION INTRAMUSCULAR; INTRAVENOUS ONCE AS NEEDED
Status: CANCELLED | OUTPATIENT
Start: 2023-01-01 | End: 2035-02-24

## 2023-01-01 RX ORDER — METOCLOPRAMIDE HYDROCHLORIDE 5 MG/ML
10 INJECTION INTRAMUSCULAR; INTRAVENOUS ONCE
Status: CANCELLED | OUTPATIENT
Start: 2023-01-01 | End: 2023-01-01

## 2023-01-01 RX ORDER — VIBEGRON 75 MG/1
1 TABLET, FILM COATED ORAL
COMMUNITY
Start: 2023-01-01 | End: 2023-01-01

## 2023-01-01 RX ORDER — LIDOCAINE HYDROCHLORIDE 10 MG/ML
1 INJECTION, SOLUTION EPIDURAL; INFILTRATION; INTRACAUDAL; PERINEURAL ONCE
Status: DISCONTINUED | OUTPATIENT
Start: 2023-01-01 | End: 2023-01-01 | Stop reason: HOSPADM

## 2023-01-01 RX ORDER — FENTANYL CITRATE 50 UG/ML
INJECTION, SOLUTION INTRAMUSCULAR; INTRAVENOUS
Status: DISCONTINUED | OUTPATIENT
Start: 2023-01-01 | End: 2023-01-01

## 2023-01-01 RX ORDER — EPINEPHRINE 0.3 MG/.3ML
0.3 INJECTION SUBCUTANEOUS ONCE AS NEEDED
Status: CANCELLED | OUTPATIENT
Start: 2023-01-01

## 2023-01-01 RX ORDER — MEPERIDINE HYDROCHLORIDE 25 MG/ML
12.5 INJECTION INTRAMUSCULAR; INTRAVENOUS; SUBCUTANEOUS ONCE
Status: CANCELLED | OUTPATIENT
Start: 2023-01-01 | End: 2023-01-01

## 2023-01-01 RX ORDER — PHENYLEPHRINE HCL IN 0.9% NACL 1 MG/10 ML
SYRINGE (ML) INTRAVENOUS
Status: DISCONTINUED | OUTPATIENT
Start: 2023-01-01 | End: 2023-01-01

## 2023-01-01 RX ORDER — ATROPINE SULFATE 0.4 MG/ML
0.4 INJECTION, SOLUTION ENDOTRACHEAL; INTRAMEDULLARY; INTRAMUSCULAR; INTRAVENOUS; SUBCUTANEOUS ONCE AS NEEDED
Status: CANCELLED | OUTPATIENT
Start: 2023-01-01

## 2023-01-01 RX ORDER — ONDANSETRON HYDROCHLORIDE 2 MG/ML
INJECTION, SOLUTION INTRAMUSCULAR; INTRAVENOUS
Status: DISCONTINUED | OUTPATIENT
Start: 2023-01-01 | End: 2023-01-01

## 2023-01-01 RX ORDER — UREA 10 %
LOTION (ML) TOPICAL
COMMUNITY

## 2023-01-01 RX ORDER — FLUOROURACIL 50 MG/ML
2160 INJECTION, SOLUTION INTRAVENOUS
Status: CANCELLED | OUTPATIENT
Start: 2023-01-01

## 2023-01-01 RX ORDER — PROPOFOL 10 MG/ML
VIAL (ML) INTRAVENOUS
Status: DISCONTINUED | OUTPATIENT
Start: 2023-01-01 | End: 2023-01-01

## 2023-01-01 RX ORDER — DIPHENHYDRAMINE HYDROCHLORIDE 50 MG/ML
50 INJECTION INTRAMUSCULAR; INTRAVENOUS ONCE AS NEEDED
Status: DISCONTINUED | OUTPATIENT
Start: 2023-01-01 | End: 2023-01-01 | Stop reason: HOSPADM

## 2023-01-01 RX ORDER — HYDROMORPHONE HYDROCHLORIDE 2 MG/ML
0.4 INJECTION, SOLUTION INTRAMUSCULAR; INTRAVENOUS; SUBCUTANEOUS EVERY 5 MIN PRN
Status: DISCONTINUED | OUTPATIENT
Start: 2023-01-01 | End: 2023-01-01 | Stop reason: HOSPADM

## 2023-01-01 RX ORDER — BUPIVACAINE HYDROCHLORIDE 2.5 MG/ML
INJECTION, SOLUTION EPIDURAL; INFILTRATION; INTRACAUDAL
Status: DISCONTINUED | OUTPATIENT
Start: 2023-01-01 | End: 2023-01-01 | Stop reason: HOSPADM

## 2023-01-01 RX ORDER — TRAMADOL HYDROCHLORIDE 50 MG/1
50 TABLET ORAL EVERY 6 HOURS PRN
Qty: 120 TABLET | Refills: 0 | Status: SHIPPED | OUTPATIENT
Start: 2023-01-01 | End: 2023-01-01 | Stop reason: SDUPTHER

## 2023-01-01 RX ORDER — ONDANSETRON 2 MG/ML
4 INJECTION INTRAMUSCULAR; INTRAVENOUS ONCE AS NEEDED
Status: DISCONTINUED | OUTPATIENT
Start: 2023-01-01 | End: 2023-01-01 | Stop reason: HOSPADM

## 2023-01-01 RX ORDER — SODIUM CHLORIDE 9 MG/ML
INJECTION, SOLUTION INTRAVENOUS CONTINUOUS
Status: DISCONTINUED | OUTPATIENT
Start: 2023-01-01 | End: 2023-01-01 | Stop reason: HOSPADM

## 2023-01-01 RX ORDER — DULAGLUTIDE 0.75 MG/.5ML
INJECTION, SOLUTION SUBCUTANEOUS
COMMUNITY

## 2023-01-01 RX ORDER — HYDROMORPHONE HYDROCHLORIDE 2 MG/ML
0.2 INJECTION, SOLUTION INTRAMUSCULAR; INTRAVENOUS; SUBCUTANEOUS EVERY 5 MIN PRN
Status: CANCELLED | OUTPATIENT
Start: 2023-01-01

## 2023-01-01 RX ORDER — ACETAMINOPHEN 500 MG
1000 TABLET ORAL ONCE
Status: CANCELLED | OUTPATIENT
Start: 2023-01-01 | End: 2023-01-01

## 2023-01-01 RX ORDER — SODIUM CHLORIDE, SODIUM LACTATE, POTASSIUM CHLORIDE, CALCIUM CHLORIDE 600; 310; 30; 20 MG/100ML; MG/100ML; MG/100ML; MG/100ML
INJECTION, SOLUTION INTRAVENOUS CONTINUOUS
Status: DISCONTINUED | OUTPATIENT
Start: 2023-01-01 | End: 2023-01-01 | Stop reason: HOSPADM

## 2023-01-01 RX ORDER — TRAMADOL HYDROCHLORIDE 50 MG/1
50 TABLET ORAL EVERY 6 HOURS PRN
Qty: 120 TABLET | Refills: 0 | Status: SHIPPED | OUTPATIENT
Start: 2023-01-01

## 2023-01-01 RX ORDER — HYDROCODONE BITARTRATE AND ACETAMINOPHEN 5; 325 MG/1; MG/1
1 TABLET ORAL
Status: CANCELLED | OUTPATIENT
Start: 2023-01-01

## 2023-01-01 RX ORDER — LIDOCAINE HYDROCHLORIDE 20 MG/ML
INJECTION, SOLUTION EPIDURAL; INFILTRATION; INTRACAUDAL; PERINEURAL
Status: DISCONTINUED | OUTPATIENT
Start: 2023-01-01 | End: 2023-01-01

## 2023-01-01 RX ORDER — PROPOFOL 10 MG/ML
VIAL (ML) INTRAVENOUS CONTINUOUS PRN
Status: DISCONTINUED | OUTPATIENT
Start: 2023-01-01 | End: 2023-01-01

## 2023-01-01 RX ORDER — ATROPINE SULFATE 0.4 MG/ML
0.4 INJECTION, SOLUTION ENDOTRACHEAL; INTRAMEDULLARY; INTRAMUSCULAR; INTRAVENOUS; SUBCUTANEOUS ONCE AS NEEDED
Status: DISCONTINUED | OUTPATIENT
Start: 2023-01-01 | End: 2023-01-01 | Stop reason: HOSPADM

## 2023-01-01 RX ORDER — LIDOCAINE HYDROCHLORIDE 10 MG/ML
INJECTION, SOLUTION EPIDURAL; INFILTRATION; INTRACAUDAL; PERINEURAL
Status: DISCONTINUED | OUTPATIENT
Start: 2023-01-01 | End: 2023-01-01

## 2023-01-01 RX ORDER — MIDAZOLAM HYDROCHLORIDE 1 MG/ML
2 INJECTION INTRAMUSCULAR; INTRAVENOUS ONCE AS NEEDED
Status: COMPLETED | OUTPATIENT
Start: 2023-01-01 | End: 2023-01-01

## 2023-01-01 RX ORDER — HYDROCHLOROTHIAZIDE 12.5 MG/1
TABLET ORAL
COMMUNITY

## 2023-01-01 RX ORDER — OLANZAPINE 5 MG/1
5 TABLET ORAL NIGHTLY
Qty: 30 TABLET | Refills: 2 | Status: SHIPPED | OUTPATIENT
Start: 2023-01-01 | End: 2024-02-08

## 2023-01-01 RX ORDER — LIDOCAINE HYDROCHLORIDE 10 MG/ML
1 INJECTION, SOLUTION EPIDURAL; INFILTRATION; INTRACAUDAL; PERINEURAL ONCE
Status: CANCELLED | OUTPATIENT
Start: 2023-01-01 | End: 2023-01-01

## 2023-01-01 RX ORDER — DEXAMETHASONE 4 MG/1
8 TABLET ORAL DAILY
Qty: 24 TABLET | Refills: 5 | Status: SHIPPED | OUTPATIENT
Start: 2023-01-01

## 2023-01-01 RX ORDER — IPRATROPIUM BROMIDE AND ALBUTEROL SULFATE 2.5; .5 MG/3ML; MG/3ML
3 SOLUTION RESPIRATORY (INHALATION) ONCE AS NEEDED
Status: CANCELLED | OUTPATIENT
Start: 2023-01-01 | End: 2035-02-24

## 2023-01-01 RX ADMIN — MIDAZOLAM HYDROCHLORIDE 2 MG: 1 INJECTION, SOLUTION INTRAMUSCULAR; INTRAVENOUS at 07:10

## 2023-01-01 RX ADMIN — HEPARIN 500 UNITS: 100 SYRINGE at 03:11

## 2023-01-01 RX ADMIN — SODIUM CHLORIDE: 9 INJECTION, SOLUTION INTRAVENOUS at 10:11

## 2023-01-01 RX ADMIN — LIDOCAINE HYDROCHLORIDE 50 MG: 10 INJECTION, SOLUTION EPIDURAL; INFILTRATION; INTRACAUDAL; PERINEURAL at 07:10

## 2023-01-01 RX ADMIN — FENTANYL CITRATE 50 MCG: 50 INJECTION, SOLUTION INTRAMUSCULAR; INTRAVENOUS at 08:10

## 2023-01-01 RX ADMIN — ONDANSETRON 4 MG: 2 INJECTION INTRAMUSCULAR; INTRAVENOUS at 08:10

## 2023-01-01 RX ADMIN — DEXTROSE MONOHYDRATE: 50 INJECTION, SOLUTION INTRAVENOUS at 09:10

## 2023-01-01 RX ADMIN — SODIUM CHLORIDE 276 MG: 9 INJECTION, SOLUTION INTRAVENOUS at 01:11

## 2023-01-01 RX ADMIN — PALONOSETRON 0.25 MG: 0.25 INJECTION, SOLUTION INTRAVENOUS at 10:10

## 2023-01-01 RX ADMIN — PEGFILGRASTIM-CBQV 6 MG: 6 INJECTION, SOLUTION SUBCUTANEOUS at 02:11

## 2023-01-01 RX ADMIN — PROPOFOL 80 MG: 10 INJECTION, EMULSION INTRAVENOUS at 10:09

## 2023-01-01 RX ADMIN — LIDOCAINE HYDROCHLORIDE 80 MG: 20 INJECTION, SOLUTION EPIDURAL; INFILTRATION; INTRACAUDAL; PERINEURAL at 10:09

## 2023-01-01 RX ADMIN — SODIUM CHLORIDE 300 MG: 9 INJECTION, SOLUTION INTRAVENOUS at 12:10

## 2023-01-01 RX ADMIN — SODIUM CHLORIDE, SODIUM GLUCONATE, SODIUM ACETATE, POTASSIUM CHLORIDE AND MAGNESIUM CHLORIDE: 526; 502; 368; 37; 30 INJECTION, SOLUTION INTRAVENOUS at 10:09

## 2023-01-01 RX ADMIN — ATROPINE SULFATE 0.4 MG: 0.4 INJECTION, SOLUTION INTRAVENOUS at 03:11

## 2023-01-01 RX ADMIN — PALONOSETRON 0.25 MG: 0.25 INJECTION, SOLUTION INTRAVENOUS at 10:11

## 2023-01-01 RX ADMIN — PROPOFOL 150 MCG/KG/MIN: 10 INJECTION, EMULSION INTRAVENOUS at 10:09

## 2023-01-01 RX ADMIN — CEFAZOLIN 2 G: 330 INJECTION, POWDER, FOR SOLUTION INTRAMUSCULAR; INTRAVENOUS at 07:10

## 2023-01-01 RX ADMIN — OXALIPLATIN 157 MG: 50 INJECTION, SOLUTION, CONCENTRATE INTRAVENOUS at 10:10

## 2023-01-01 RX ADMIN — FLUOROURACIL 4925 MG: 50 INJECTION, SOLUTION INTRAVENOUS at 02:10

## 2023-01-01 RX ADMIN — DEXTROSE MONOHYDRATE 157 MG: 5 INJECTION, SOLUTION INTRAVENOUS at 11:11

## 2023-01-01 RX ADMIN — OXALIPLATIN 174 MG: 5 INJECTION, SOLUTION INTRAVENOUS at 10:10

## 2023-01-01 RX ADMIN — SODIUM CHLORIDE, POTASSIUM CHLORIDE, SODIUM LACTATE AND CALCIUM CHLORIDE: 600; 310; 30; 20 INJECTION, SOLUTION INTRAVENOUS at 07:10

## 2023-01-01 RX ADMIN — FENTANYL CITRATE 50 MCG: 50 INJECTION, SOLUTION INTRAMUSCULAR; INTRAVENOUS at 11:09

## 2023-01-01 RX ADMIN — FLUOROURACIL 4400 MG: 50 INJECTION, SOLUTION INTRAVENOUS at 02:11

## 2023-01-01 RX ADMIN — IRINOTECAN HYDROCHLORIDE 276 MG: 20 INJECTION, SOLUTION INTRAVENOUS at 12:10

## 2023-01-01 RX ADMIN — HEPARIN 500 UNITS: 100 SYRINGE at 12:10

## 2023-01-01 RX ADMIN — PROPOFOL 150 MG: 10 INJECTION, EMULSION INTRAVENOUS at 08:10

## 2023-01-01 RX ADMIN — ASCORBIC ACID, VITAMIN A PALMITATE, CHOLECALCIFEROL, THIAMINE HYDROCHLORIDE, RIBOFLAVIN-5 PHOSPHATE SODIUM, PYRIDOXINE HYDROCHLORIDE, NIACINAMIDE, DEXPANTHENOL, ALPHA-TOCOPHEROL ACETATE, VITAMIN K1, FOLIC ACID, BIOTIN, CYANOCOBALAMIN 500 ML/HR: 200; 3300; 200; 6; 3.6; 6; 40; 15; 10; 150; 600; 60; 5 INJECTION, SOLUTION INTRAVENOUS at 02:11

## 2023-01-01 RX ADMIN — Medication 100 MCG: at 08:10

## 2023-01-01 RX ADMIN — Medication 500 UNITS: at 04:11

## 2023-01-01 RX ADMIN — FLUOROURACIL 4400 MG: 50 INJECTION, SOLUTION INTRAVENOUS at 02:10

## 2023-01-01 RX ADMIN — OXALIPLATIN 157 MG: 5 INJECTION, SOLUTION INTRAVENOUS at 11:11

## 2023-01-01 RX ADMIN — ATROPINE SULFATE 0.4 MG: 0.4 INJECTION, SOLUTION INTRAVENOUS at 01:11

## 2023-01-01 RX ADMIN — METHOCARBAMOL 1000 MG: 100 INJECTION INTRAMUSCULAR; INTRAVENOUS at 08:10

## 2023-01-01 RX ADMIN — PALONOSETRON HYDROCHLORIDE 0.25 MG: 0.25 INJECTION INTRAVENOUS at 11:11

## 2023-01-01 RX ADMIN — FENTANYL CITRATE 25 MCG: 50 INJECTION, SOLUTION INTRAMUSCULAR; INTRAVENOUS at 11:09

## 2023-01-01 RX ADMIN — Medication 10 ML: at 01:11

## 2023-01-01 RX ADMIN — PEGFILGRASTIM-CBQV 6 MG: 6 INJECTION, SOLUTION SUBCUTANEOUS at 12:10

## 2023-01-01 RX ADMIN — SODIUM CHLORIDE: 9 INJECTION, SOLUTION INTRAVENOUS at 01:11

## 2023-01-01 RX ADMIN — PEGFILGRASTIM-CBQV 6 MG: 6 INJECTION, SOLUTION SUBCUTANEOUS at 03:11

## 2023-01-01 RX ADMIN — FLUOROURACIL 4400 MG: 50 INJECTION, SOLUTION INTRAVENOUS at 03:11

## 2023-01-01 RX ADMIN — ATROPINE SULFATE 0.4 MG: 0.4 INJECTION, SOLUTION INTRAVENOUS at 12:10

## 2023-01-01 RX ADMIN — HEPARIN 500 UNITS: 100 SYRINGE at 01:11

## 2023-03-20 PROBLEM — Z00.00 MEDICARE ANNUAL WELLNESS VISIT, SUBSEQUENT: Status: RESOLVED | Noted: 2022-12-19 | Resolved: 2023-01-01

## 2023-09-28 NOTE — ANESTHESIA PREPROCEDURE EVALUATION
09/28/2023  Alonso Andrade is a 68 y.o., male presents for Upper EUS.    Other Medical History   Hypertension Diabetes mellitus   Cataracts, bilateral DULCE on CPAP   History of prostate cancer Hyperlipidemia   Gallstones Left sided abdominal pain   Other intra-abdominal and pelvic swelling, mass and lump Abnormal abdominal ultrasound   Abnormal findings on diagnostic imaging of abdomen Cancer     Surgical History    COLONOSCOPY W/ POLYPECTOMY CATARACT EXTRACTION   SHOULDER ARTHROSCOPY NASAL SEPTUM SURGERY   PROSTATECTOMY VASECTOMY   BLEPHAROPLASTY BROW LIFT   CARDIAC CATHETERIZATION        Pre-op Assessment    I have reviewed the Patient Summary Reports.     I have reviewed the Nursing Notes. I have reviewed the NPO Status.   I have reviewed the Medications.     Review of Systems  Anesthesia Hx:  No problems with previous Anesthesia    Social:  Non-Smoker    Cardiovascular:   Hypertension CAD   Branch CAD being treated medically   Pulmonary:   Sleep Apnea    Endocrine:   Diabetes        Physical Exam  General: Well nourished, Cooperative, Alert and Oriented    Airway:  Mallampati: III   Mouth Opening: Normal  TM Distance: Normal  Tongue: Normal  Neck ROM: Normal ROM    Dental:  Intact    Chest/Lungs:  Clear to auscultation, Normal Respiratory Rate    Heart:  Rate: Normal  Rhythm: Regular Rhythm  Sounds: Normal    Abdomen:  Normal, Soft, Nontender        Anesthesia Plan  Type of Anesthesia, risks & benefits discussed:    Anesthesia Type: MAC  Intra-op Monitoring Plan: Standard ASA Monitors  Post Op Pain Control Plan: multimodal analgesia  Induction:  IV  Airway Plan: Direct  Informed Consent: Informed consent signed with the Patient and all parties understand the risks and agree with anesthesia plan.  All questions answered.   ASA Score: 3  Day of Surgery Review of History & Physical: H&P Update referred to  the surgeon/provider.    Ready For Surgery From Anesthesia Perspective.     .

## 2023-09-28 NOTE — ANESTHESIA POSTPROCEDURE EVALUATION
Anesthesia Post Evaluation    Patient: Alonso Andrade    Procedure(s) Performed: Procedure(s) (LRB):  UPPER EUS W/  FNA (N/A)  EGD (ESOPHAGOGASTRODUODENOSCOPY) (N/A)    Final Anesthesia Type: general      Patient location during evaluation: PACU  Patient participation: Yes- Able to Participate  Level of consciousness: awake and alert  Post-procedure vital signs: reviewed and stable  Pain management: adequate  Airway patency: patent  DULCE mitigation strategies: Multimodal analgesia  PONV status at discharge: No PONV  Anesthetic complications: no      Cardiovascular status: hemodynamically stable  Respiratory status: unassisted  Hydration status: euvolemic  Follow-up not needed.          Vitals Value Taken Time   /74 09/28/23 1217   Temp 36 °C (96.8 °F) 09/28/23 1145   Pulse 55 09/28/23 1217   Resp 18 09/28/23 1217   SpO2 95 % 09/28/23 1217         No case tracking events are documented in the log.      Pain/Rosemary Score: Rosemary Score: 10 (9/28/2023 12:32 PM)  Modified Rosemary Score: 20 (9/28/2023 12:32 PM)

## 2023-09-28 NOTE — TRANSFER OF CARE
Anesthesia Transfer of Care Note    Patient: Alonso Andrade    Procedure(s) Performed: Procedure(s) (LRB):  UPPER EUS W/  FNA (N/A)  EGD (ESOPHAGOGASTRODUODENOSCOPY) (N/A)    Patient location: OPS    Anesthesia Type: general    Transport from OR: Transported from OR on room air with adequate spontaneous ventilation    Post pain: adequate analgesia    Post assessment: no apparent anesthetic complications and tolerated procedure well    Post vital signs: stable    Level of consciousness: awake, alert, oriented and responds to stimulation    Nausea/Vomiting: no nausea/vomiting    Complications: none    Transfer of care protocol was followed      Last vitals:   Visit Vitals  /60   Pulse 60   Temp 36 °C (96.8 °F) (Skin)   Resp 12   Ht 6' (1.829 m)   Wt 83.1 kg (183 lb 3.2 oz)   SpO2 97%   BMI 24.85 kg/m²

## 2023-09-28 NOTE — PROVATION PATIENT INSTRUCTIONS
Discharge Summary/Instructions after an Endoscopic Procedure  Patient Name: Alonso Andrade  Patient MRN: [id number]  Patient YOB: 1955 Thursday, September 28, 2023  Alfonso Ewing MD  Dear patient,  As a result of recent federal legislation (The Federal Cures Act), you may   receive lab or pathology results from your procedure in your [facility]   account before your physician is able to contact you. Your physician or   their representative will relay the results to you with their   recommendations at their soonest availability.  Thank you,  RESTRICTIONS:  During your procedure today, you received medications for sedation.  These   medications may affect your judgment, balance and coordination.  Therefore,   for 24 hours, you have the following restrictions:   - DO NOT drive a car, operate machinery, make legal/financial decisions,   sign important papers or drink alcohol.    ACTIVITY:  Today: no heavy lifting, straining or running due to procedural   sedation/anesthesia.  The following day: return to full activity including work.  DIET:  Eat and drink normally unless instructed otherwise.     TREATMENT FOR COMMON SIDE EFFECTS:  - Mild abdominal pain, nausea, belching, bloating or excessive gas:  rest,   eat lightly and use a heating pad.  - Sore Throat: treat with throat lozenges and/or gargle with warm salt   water.  - Because air was used during the procedure, expelling large amounts of air   from your rectum or belching is normal.  - If a bowel prep was taken, you may not have a bowel movement for 1-3 days.    This is normal.  SYMPTOMS TO WATCH FOR AND REPORT TO YOUR PHYSICIAN:  1. Abdominal pain or bloating, other than gas cramps.  2. Chest pain.  3. Back pain.  4. Signs of infection such as: chills or fever occurring within 24 hours   after the procedure.  5. Rectal bleeding, which would show as bright red, maroon, or black stools.   (A tablespoon of blood from the rectum is not serious, especially  if   hemorrhoids are present.)  6. Vomiting.  7. Weakness or dizziness.  GO DIRECTLY TO THE NEAREST EMERGENCY ROOM IF YOU HAVE ANY OF THE FOLLOWING:      Difficulty breathing              Chills and/or fever over 101 F   Persistent vomiting and/or vomiting blood   Severe abdominal pain   Severe chest pain   Black, tarry stools   Bleeding- more than one tablespoon   Any other symptom or condition that you feel may need urgent attention  Your doctor recommends these additional instructions:  If any biopsies were taken, your doctors clinic will contact you in 1 to 2   weeks with any results.  - Discharge patient to home (with spouse).   - Clear liquid diet today, then advance as tolerated to resume previous   diet.   - Continue present medications.   - Observe patient's clinical course.   - Await cytology results.   - Return to referring physician as previously scheduled.   - Refer to an oncologist at appointment to be scheduled.   - The findings and recommendations were discussed with the patient and their   spouse.  For questions, problems or results please call your physician - Alfonso Ewing MD at Work:  [phone].  [facility], EMERGENCY ROOM PHONE NUMBER: [phone]  IF A COMPLICATION OR EMERGENCY SITUATION ARISES AND YOU ARE UNABLE TO REACH   YOUR PHYSICIAN - GO DIRECTLY TO THE EMERGENCY ROOM.  Alfonso Ewing MD  9/28/2023 11:35:10 AM  This report has been verified and signed electronically.  Dear patient,  As a result of recent federal legislation (The Federal Cures Act), you may   receive lab or pathology results from your procedure in your [facility]   account before your physician is able to contact you. Your physician or   their representative will relay the results to you with their   recommendations at their soonest availability.  Thank you,  PROVATION

## 2023-09-28 NOTE — DISCHARGE INSTRUCTIONS
-NO driving and NO alcohol consumption for 24 hours and while taking narcotic pain medication.    -Although no incisions were made monitor for signs of infection such as fever, chills.    -Sore throat and/or mild cough is expected. Hard candies, peppermints, throat lozenges, gargling with warm water and salt may help    -Report to your nearest ER and/notify your doctor if you experience any SUDDEN/SEVERE chest/abdominal pain, weakness, trouble breathing, uncontrolled pain, vomiting/coughing up blood.

## 2023-09-28 NOTE — H&P
Endoscopy History and Physical    PCP - Vishnu Pa MD    Procedure - EUS  ASA & Mallampati - per anesthesia      HPI:  This is a 68 y.o. male here for evaluation TOP mass and liver lesions on recent CT scan      ROS:  Constitutional: No fevers, chills, No weight loss  ENT: No allergies  CV: No chest pain  Pulm: No shortness of breath  GI: see HPI  Derm: No rash    Medical History:  has a past medical history of Abnormal abdominal ultrasound, Abnormal findings on diagnostic imaging of abdomen, Cancer, Cataracts, bilateral, Diabetes mellitus, Gallstones, History of prostate cancer, Hyperlipidemia, Hypertension, Left sided abdominal pain, DULCE on CPAP, and Other intra-abdominal and pelvic swelling, mass and lump.    Surgical History:  has a past surgical history that includes Colonoscopy w/ polypectomy (04/25/2022); Cataract extraction (Bilateral); Shoulder arthroscopy; Nasal septum surgery; Prostatectomy; Vasectomy; Blepharoplasty (Bilateral, 12/05/2022); Brow lift (N/A, 12/05/2022); and Cardiac catheterization.    Family History: family history includes Asthma in his mother; COPD in his father; Cancer in his brother, father, and mother; Diabetes in his maternal grandfather; Emphysema in his father; Hypertension in his father and mother; Stroke in his maternal grandfather.     Social History:  reports that he has never smoked. He has never used smokeless tobacco. He reports that he does not drink alcohol and does not use drugs.    Review of patient's allergies indicates:  No Known Allergies    Medications:   Medications Prior to Admission   Medication Sig Dispense Refill Last Dose    amLODIPine (NORVASC) 2.5 MG tablet Take 2.5 mg by mouth once daily.   9/27/2023    ascorbic acid, vitamin C, (VITAMIN C) 1000 MG tablet Take 1,000 mg by mouth once daily.   Past Month    empagliflozin (JARDIANCE) 25 mg tablet Take 25 mg by mouth once daily.   9/27/2023    l-methylfolate-b2-b6-b12 (CEREFOLIN) 6-5-50-1 mg Tab Take 1  tablet by mouth once daily.   9/27/2023    losartan-hydrochlorothiazide 100-25 mg (HYZAAR) 100-25 mg per tablet Take 1 tablet by mouth once daily.   9/27/2023    metFORMIN (GLUCOPHAGE) 500 MG tablet Take 1 tablet by mouth 2 (two) times daily with meals.   9/27/2023    quercetin 500 mg Cap Take 1 capsule by mouth Daily.   Past Month    rosuvastatin (CRESTOR) 40 MG Tab Take 40 mg by mouth once daily.   9/27/2023    sildenafil (REVATIO) 20 mg Tab Take 20 mg by mouth 3 (three) times daily.   9/27/2023    TOUJEO SOLOSTAR U-300 INSULIN 300 unit/mL (1.5 mL) InPn pen Inject 85 Units into the skin once daily.   9/27/2023    TRULICITY 3 mg/0.5 mL pen injector Inject 3 mg into the skin once a week.   9/27/2023    vitamin D (VITAMIN D3) 1000 units Tab Take 1,000 Units by mouth once daily.   9/27/2023    zinc gluconate 50 mg tablet Take 50 mg by mouth once daily.   Past Month    aspirin 81 MG Chew Take 81 mg by mouth once daily.   9/26/2023         Objective Findings:    Vital Signs: see nursing notes  Physical Exam:  General Appearance: Well appearing in no acute distress  Neuro: A&O x 3, no focal deficits  Eyes:    No scleral icterus  ENT: Neck supple  Lungs: CTA anteriorly  Heart:  S1, S2 normal, no murmurs heard  Abdomen: Soft, non tender, non distended with positive bowel sounds. No hepatosplenomegaly, ascites, or mass  Extremities: no edema  Skin: No rash      Labs:  Lab Results   Component Value Date    WBC 14.56 (H) 09/28/2023    HGB 11.7 (L) 09/28/2023    HCT 37.4 (L) 09/28/2023     09/28/2023    CHOL 84 01/03/2023    TRIG 112 01/03/2023    HDL 33 (L) 01/03/2023    ALT 45 09/28/2023    AST 37 (H) 09/28/2023     09/28/2023    K 4.3 09/28/2023    CREATININE 0.91 09/28/2023    BUN 15.5 09/28/2023    CO2 25 09/28/2023    TSH 2.505 01/03/2023    PSA 0.00 01/03/2023    INR 1.2 09/28/2023    HGBA1C 6.6 11/23/2022       I have explained the risks and benefits of endoscopy procedures to the patient including but not  limited to bleeding, perforation, infection, and death.    Alfonso Ewing MD

## 2023-10-03 PROBLEM — C78.7 SECONDARY MALIGNANT NEOPLASM OF LIVER: Status: ACTIVE | Noted: 2023-01-01

## 2023-10-03 PROBLEM — C25.9 PANCREATIC CANCER: Status: ACTIVE | Noted: 2023-01-01

## 2023-10-03 NOTE — PROGRESS NOTES
Subjective:       Patient ID: Alonso Andrade is a 68 y.o. male.    Chief Complaint: new patient (Patient reports pain in both legs and swelling in feet for the last two weeks. )      Diagnosis:  Stage IV pancreatic cancer with biopsy proven liver mets.   Bilateral lower extremity DVTs    Current Treatment:   Likely FOLFIRINOX, pending MD Chicas consultation.    Treatment History:  N/A    HPI:  68-year-old male who presented to the emergency department at [facility] on 09/07/2023 with difficulty taking a deep breath and some shortness of breath after having gone on a trip.  A CT angiogram of the chest was performed and showed no evidence of pulmonary embolus, however it did mentioned scattered hepatic hypodensities suspicious for metastatic disease.  CT of the abdomen and pelvis was recommended using hepatic mass protocol.  The patient then underwent a CT of the abdomen and pelvis with and without contrast on 09/08/2023 at [facility] that showed a 2.1 cm mass in the tail of the pancreas representing primary pancreatic neoplasm versus metastatic disease with diffuse hepatic metastatic disease seen as well.  The patient was then referred to Dr. Alfonso caldwell, underwent an EUS on 09/28/2023, this revealed multiple metastatic lesions in the left lobe of the liver that were biopsied, there was a mass in the pancreatic tail that was biopsied.  This was ultrasonographically staged as a uT3, N0, M1 stage IV.  Pathology from the biopsies proved poorly differentiated adenocarcinoma with signet ring cells features from the pancreas and also the liver.  The patient was referred to me.  I saw him on 10/03/2023.  At that visit, he stated to have bilateral leg swelling and pain.  He also had some twinges in his abdomen that got better with water.    Interval History:   Initial consult note.      Past Medical History:   Diagnosis Date    Abnormal abdominal ultrasound     Abnormal findings on diagnostic imaging of  abdomen     Cancer     liver and pancreas    Cataracts, bilateral     Diabetes mellitus     Gallstones     History of prostate cancer     Hyperlipidemia     Hypertension     Left sided abdominal pain     DULCE on CPAP     Other intra-abdominal and pelvic swelling, mass and lump       Past Surgical History:   Procedure Laterality Date    BLEPHAROPLASTY Bilateral 12/05/2022    Procedure: SPLIT CASE - ENDOBROW WITH TINES; BILATERAL UPPER BLEPHS;  Surgeon: Hi Dos Santos MD;  Location: [facility] OR;  Service: ENT;  Laterality: Bilateral;    BROW LIFT N/A 12/05/2022    Procedure: RHYTIDECTOMY, FOREHEAD;  Surgeon: Hi Dos Santos MD;  Location: [facility] OR;  Service: ENT;  Laterality: N/A;    CARDIAC CATHETERIZATION      CATARACT EXTRACTION Bilateral     COLONOSCOPY W/ POLYPECTOMY  04/25/2022    [facility]     ENDOSCOPIC ULTRASOUND OF UPPER GASTROINTESTINAL TRACT N/A 9/28/2023    Procedure: UPPER EUS W/  FNA;  Surgeon: Alfonso Ewing MD;  Location: [facility];  Service: Gastroenterology;  Laterality: N/A;    ESOPHAGOGASTRODUODENOSCOPY N/A 9/28/2023    Procedure: EGD (ESOPHAGOGASTRODUODENOSCOPY);  Surgeon: Alfonso Ewing MD;  Location: [facility];  Service: Gastroenterology;  Laterality: N/A;    NASAL SEPTUM SURGERY      PROSTATECTOMY      SHOULDER ARTHROSCOPY      VASECTOMY       Social History     Socioeconomic History    Marital status:    Tobacco Use    Smoking status: Never    Smokeless tobacco: Never   Substance and Sexual Activity    Alcohol use: Never    Drug use: Never      Family History   Problem Relation Age of Onset    Cancer Mother     Hypertension Mother     Asthma Mother     Cancer Father     Hypertension Father     COPD Father     Emphysema Father     Cancer Brother     Stroke Maternal Grandfather     Diabetes Maternal Grandfather       Review of patient's allergies indicates:  No Known Allergies   Review of Systems   Constitutional:  Negative for chills, diaphoresis, fatigue, fever and unexpected  weight change.   HENT:  Negative for nasal congestion, mouth sores, sinus pressure/congestion and sore throat.    Eyes:  Negative for pain and visual disturbance.   Respiratory:  Negative for cough, chest tightness and shortness of breath.    Cardiovascular:  Positive for leg swelling (With pain bilaterally). Negative for chest pain and palpitations.   Gastrointestinal:  Negative for abdominal distention, abdominal pain, blood in stool, constipation and diarrhea.   Genitourinary:  Negative for dysuria, frequency and hematuria.   Musculoskeletal:  Negative for arthralgias and back pain.   Integumentary:  Negative for rash.   Neurological:  Negative for dizziness, weakness, numbness and headaches.   Hematological:  Negative for adenopathy.   Psychiatric/Behavioral:  Negative for confusion.          Objective:      Physical Exam  Vitals reviewed.   Constitutional:       General: He is awake.      Appearance: Normal appearance.   HENT:      Head: Normocephalic and atraumatic.      Right Ear: Hearing normal.      Left Ear: Hearing normal.      Nose: Nose normal.   Eyes:      General: Lids are normal. Vision grossly intact.      Extraocular Movements: Extraocular movements intact.      Conjunctiva/sclera: Conjunctivae normal.   Cardiovascular:      Rate and Rhythm: Normal rate and regular rhythm.      Pulses: Normal pulses.      Heart sounds: Normal heart sounds.   Pulmonary:      Effort: Pulmonary effort is normal.      Breath sounds: Normal breath sounds. No wheezing, rhonchi or rales.   Abdominal:      General: Bowel sounds are normal.      Palpations: Abdomen is soft.      Tenderness: There is no abdominal tenderness.   Musculoskeletal:      Cervical back: Full passive range of motion without pain.      Right lower leg: Edema (painful) present.      Left lower leg: Edema (painful) present.   Lymphadenopathy:      Cervical: No cervical adenopathy.      Upper Body:      Right upper body: No supraclavicular or axillary  adenopathy.      Left upper body: No supraclavicular or axillary adenopathy.   Skin:     General: Skin is warm.   Neurological:      General: No focal deficit present.      Mental Status: He is alert and oriented to person, place, and time.   Psychiatric:         Attention and Perception: Attention normal.         Mood and Affect: Mood and affect normal.         Behavior: Behavior is cooperative.         LABS AND IMAGING REVIEWED IN EPIC          Assessment:   Stage IV pancreatic cancer with biopsy proven liver mets.   Bilateral lower extremity DVTs        Plan:       I did have a long conversation with the patient and his family about the staging, prognosis, and potential treatment options. I explained to the patient that I would like to treat with FOLFIRINOX. I explained all the side effects.    We also discuss the role of clinical trials; He has an appointment with MD Chicas next week    We will plan to start FOLFIRINOX on 10/17/2023. He will need a port-a-cath prior to starting; will try to set this up on 10/6/2023.    Genetic testing referral ordered    Next generation sequencing via [email or web address] ordered    Will set up for patient education and labs: CBC, CMP, CA 19-9, Pharmacogenetic testing     Bilateral lower extremity ultrasound done today showed right and left lower extremity DVT's.      Lovenox 1mg/kg BID sent to pharmacy due to multiple DVT's.  He knows to hold Lovenox the morning of port-a-cath procedure and take last dose that night.   He will transition to Xarelto the following day.    Tramadol sent to pharmacy.    I will see the patient back prior to cycle 2 with labs     Nick Gutierrez II, MD      I, Татьяна Sanches LPN, acted solely as a scribe for and in the presence of, Dr. Nick Gutierrez who performed the service.

## 2023-10-03 NOTE — PLAN OF CARE
START ON PATHWAY REGIMEN - Pancreatic Adenocarcinoma    [id number]        Irinotecan (Camptosar)       Oxaliplatin       Leucovorin       Fluorouracil           Additional Orders: Regimen is based on the PRODIGE 24 trial by Bashir et   al., 2018. The use of growth factor was mandated in some, but not all   mFOLFIRINOX studies; please follow institutional protocol/physician discretion   regarding use of growth factor.    **Always confirm dose/schedule in your pharmacy ordering system**    Patient Characteristics:  Metastatic Disease, First Line, PS = 0,1, BRCA1/2 and PALB2  Mutation   Absent/Unknown  Therapeutic Status: Metastatic Disease  Line of Therapy: First Line  ECOG Performance Status: 0  BRCA1/2 Mutation Status: Awaiting Test Results  PALB2 Mutation Status: Awaiting Test Results  Intent of Therapy:  Non-Curative / Palliative Intent, Discussed with Patient

## 2023-10-04 NOTE — DISCHARGE INSTRUCTIONS
Mediport/Portacath Discharge Instructions     About this topic   A portacath or port is a type of central line. A central line catheter is a very long intravenous (IV) line. A port has two main parts: A round disc and long tube called a catheter. The port looks like a small bump under your skin. It is most often in the chest area, but may be in your upper arm or lower abdomen. The long catheter tube goes under the skin and into a large vein near your heart. Once you have a port, you do not need to have IV needles stuck into your arms to have lab tests drawn or fluids given.    Once this port is in place, the doctor or nurse will use a special needle to get into the disc part. This is called accessing the port. The needle can stay in for a week at a time and will be covered with a clear bandage and taped in place. When the needle is not in the port, you will not have any holes in your skin because the port is under the skin. It can stay in place for months or years until your treatment is no longer needed. Sometimes, this kind of IV is used to draw blood for tests.  Your doctor can give you any kind of fluids, drugs, or blood products through your port. They will not bother your veins because the port goes into such a large vein in your body. You can also have all kinds of blood draws through your port. If your port is not accessed, you will just see a small bump under your skin. There will be nothing that is sticking out from your skin.           When you are at home, there may be times when your port is still accessed. This means it still has a needle in place.  Do not get your port wet while the needle is in place. Take a sponge bath or cover the area with plastic wrap before you shower.  Make sure your dressing stays clean and dry. This will help keep the needle secure and in place.  If your port is not accessed, it will just look like a small raised area under the skin.  You can shower.  You may take a bath or  swim in a pool (once incision is healed).  You do not need to have a dressing over your port.  Wear clothes that do not rub on your port.  What care is needed at home?   Wash your hands before and after touching your dressing and incision.   You may take a shower in 24 hours. Wash your incision with soap and water, pat dry, then leave open to air. The skin glue (dermabond) will peel off within 1-2 weeks. Do not peel or pick at it.  Check your incision daily for signs and symptoms of infection: redness, drainage or pus, foul odor, warmth, and swelling.   No lifting anything over 10 pounds (4.5 kg) until the doctor say otherwise.  Gradually go back to your normal activities like work, driving, or sex.  You may use an ice pack for comfort and swelling. Place ice pack over incision with a towel between your skin and the ice pack. Leave on 15 minutes and take off. Repeat 3-4 times during the day.   How to care for your port.  Wash your hands with soap and water for at least 30 seconds before touching the needle or port. Do not allow anyone to touch your needle or port with dirty hands or if they have not had special training.  If there is no needle in it, the port will be fully under your skin and you will be able to do your normal activities.  Your port will need to be flushed so a clot does not form at the end. A nurse will flush the port with saline and then heparin to avoid having a clot form inside or at the end of the tube. The nurse will do this each time you are finished getting fluid or drugs, and when the it's time to pull the needle out of your skin. If you are allergic to heparin or had a bad side effect from it, talk to your doctor.  You will get an ID card that will tell other people that you have an implanted port. It is very important to keep this card with you at all times to let doctors and nurses know what kind of port you have.  Will physical activity be limited?   You may have to limit your activity  when you have a port. Avoid activities that make you tired. Avoid sports where you might get hit in the port. Talk to your doctor about the right amount of activity for you.  Avoid activities that pull on your arms or chest.  No pulling with arm on the side of the mediport and not picking up heavy objects with the arm on the side of the port.  What problems could happen?   Infection  Bruising  Blood clot  Scar inside the large blood vessel if the port is in for a long time  When do I need to call the doctor?   Signs of infection. These include a fever of 100.4°F (38°C) or higher, chills.  Signs of wound infection. These include swelling, redness, warmth around the wound; too much pain when touched; yellowish, greenish, or bloody discharge; foul smell coming from the cut site; cut site opens up.  Pain, irritation, discomfort, or swelling at the port site or other areas of your chest when using the port.  If you hear a rushing sound in your ear when you flush your port.  You have concerns about your port.

## 2023-10-05 NOTE — NURSING
New patient oncology appointment, met with patient and wife. I introduced myself, explained my role and discussed the plan of care.

## 2023-10-05 NOTE — ANESTHESIA PREPROCEDURE EVALUATION
10/05/2023  Alonso Andrade is a 68 y.o., male , who presents for the following:    Procedure: Placement, Mediport   Anesthesia type: Local MAC   Diagnosis: Malignant neoplasm of pancreas, unspecified location of malignancy [C25.9]   Pre-op diagnosis: Malignant neoplasm of pancreas, unspecified location of malignancy [C25.9]   Location: [facility] OR 04 / [facility] OR   Surgeons: Renan Gauthier MD         Past Medical History:   Diagnosis Date    Abnormal abdominal ultrasound      Abnormal findings on diagnostic imaging of abdomen      Cancer       liver and pancreas    Cataracts, bilateral      Diabetes mellitus      Gallstones      History of prostate cancer      Hyperlipidemia      Hypertension      Left sided abdominal pain      DULCE on CPAP      Other intra-abdominal and pelvic swelling, mass and lump        LAB:  09/28 0918    HGB 11.7 Low        WBC 14.56 High        Platelets 213              K+ 4.3       Creatinine 0.91       Glucose 126 High        INR 1.2           TTE:    PET Stress :        Pre-op Assessment    I have reviewed the Patient Summary Reports.     I have reviewed the Nursing Notes. I have reviewed the NPO Status.   I have reviewed the Medications.     Review of Systems  Anesthesia Hx:  No problems with previous Anesthesia  Denies Family Hx of Anesthesia complications.   Denies Personal Hx of Anesthesia complications.   Hematology/Oncology:        Hematology Comments: Bilateral lower limb DVT's  Heterozygous thalassemia Oncology Comments: Pancreatic CA stage IV, w/ Liver METs    Cardiovascular:   Hypertension hyperlipidemia    Pulmonary:   Asthma Sleep Apnea    Endocrine:   Diabetes        Physical Exam  General: Alert and Oriented    Airway:  Mallampati: II   Mouth Opening: Normal  TM Distance: Normal  Tongue: Normal  Neck ROM: Normal ROM    Dental:  Intact  Age Related  Wear  Chest/Lungs:  Normal Respiratory Rate    Heart:  Rate: Normal  Rhythm: Regular Rhythm        Anesthesia Plan  Type of Anesthesia, risks & benefits discussed:    Anesthesia Type: Gen Natural Airway  Intra-op Monitoring Plan: Standard ASA Monitors  Post Op Pain Control Plan: IV/PO Opioids PRN  Induction:  IV  Airway Plan: Direct  Informed Consent: Informed consent signed with the Patient and all parties understand the risks and agree with anesthesia plan.  All questions answered. Patient consented to blood products? No  ASA Score: 3  Day of Surgery Review of History & Physical: H&P Update referred to the surgeon/provider.  Anesthesia Plan Notes: Nasal cannula vs facemask supplemental oxygenation   For patients with DULCE/obesity, may consider SuperNoval Nasal CPAP      Ready For Surgery From Anesthesia Perspective.     .

## 2023-10-06 NOTE — H&P
[facility] Surgical - Periop Services  History & Physical  Vascular Surgery    SUBJECTIVE:     Chief Complaint/Reason for Visit: No chief complaint on file.       History of Present Illness:  Alonso Andrade is a 68 y.o. male who presents for MediPort placement.  He was a new diagnosis of metastatic pancreatic cancer and needs access for anticipated chemotherapy..    Review of patient's allergies indicates:  No Known Allergies    Past Medical History:   Diagnosis Date    Abnormal abdominal ultrasound     Abnormal findings on diagnostic imaging of abdomen     Asthma     Cancer     liver and pancreas    Cataracts, bilateral     Diabetes mellitus     DVT femoral (deep venous thrombosis) with thrombophlebitis, bilateral 10/03/2023    Gallstones     High blood sugar     continuous blood sugar monitoring device    History of prostate cancer     Hyperlipidemia     Hypertension     Left sided abdominal pain     DULCE on CPAP     Other intra-abdominal and pelvic swelling, mass and lump      Past Surgical History:   Procedure Laterality Date    BLEPHAROPLASTY Bilateral 12/05/2022    Procedure: SPLIT CASE - ENDOBROW WITH TINES; BILATERAL UPPER BLEPHS;  Surgeon: Hi Dos Santos MD;  Location: [facility] OR;  Service: ENT;  Laterality: Bilateral;    BROW LIFT N/A 12/05/2022    Procedure: RHYTIDECTOMY, FOREHEAD;  Surgeon: Hi Dos Santos MD;  Location: [facility] OR;  Service: ENT;  Laterality: N/A;    CARDIAC CATHETERIZATION  2018    very minimal blockage    CATARACT EXTRACTION Bilateral     COLONOSCOPY W/ POLYPECTOMY  04/25/2022    [facility]     ENDOSCOPIC ULTRASOUND OF UPPER GASTROINTESTINAL TRACT N/A 09/28/2023    Procedure: UPPER EUS W/  FNA;  Surgeon: Alfonso Ewing MD;  Location: [facility] OR;  Service: Gastroenterology;  Laterality: N/A;    ESOPHAGOGASTRODUODENOSCOPY N/A 09/28/2023    Procedure: EGD (ESOPHAGOGASTRODUODENOSCOPY);  Surgeon: Alfonso Ewing MD;  Location: [facility] OR;  Service: Gastroenterology;   Laterality: N/A;    NASAL SEPTUM SURGERY      PROSTATECTOMY      SHOULDER ARTHROSCOPY      VASECTOMY       Family History   Problem Relation Age of Onset    Cancer Mother     Hypertension Mother     Asthma Mother     Cancer Father     Hypertension Father     COPD Father     Emphysema Father     Cancer Brother     Stroke Maternal Grandfather     Diabetes Maternal Grandfather      Social History     Tobacco Use    Smoking status: Never    Smokeless tobacco: Never   Substance Use Topics    Alcohol use: Yes     Alcohol/week: 3.0 standard drinks of alcohol     Types: 1 Glasses of wine, 1 Cans of beer, 1 Shots of liquor per week     Comment: once a month    Drug use: Never        Prior to Admission medications    Medication Sig Start Date End Date Taking? Authorizing Provider   amLODIPine (NORVASC) 2.5 MG tablet Take 2.5 mg by mouth once daily. 9/7/22  Yes Provider, Historical   ascorbic acid, vitamin C, (VITAMIN C) 1000 MG tablet Take 1,000 mg by mouth once daily.   Yes Provider, Historical   aspirin 81 MG Chew Take 81 mg by mouth once daily.   Yes Provider, Historical   empagliflozin (JARDIANCE) 25 mg tablet Take 25 mg by mouth once daily. 12/14/21  Yes Provider, Historical   enoxaparin (LOVENOX) 80 mg/0.8 mL Syrg Inject 0.8 mLs (80 mg total) into the skin every 12 (twelve) hours. for 6 days 10/3/23 10/9/23 Yes Nick Gutierrez II, MD   losartan-hydrochlorothiazide 100-25 mg (HYZAAR) 100-25 mg per tablet Take 1 tablet by mouth once daily.   Yes Provider, Historical   metFORMIN (GLUCOPHAGE) 500 MG tablet Take 1 tablet by mouth 2 (two) times daily with meals. 10/3/22  Yes Provider, Historical   rosuvastatin (CRESTOR) 40 MG Tab Take 40 mg by mouth once daily. 10/3/22  Yes Provider, Historical   TOUJEO SOLOSTAR U-300 INSULIN 300 unit/mL (1.5 mL) InPn pen Inject 85 Units into the skin once daily. 7/30/22  Yes Provider, Historical   traMADoL (ULTRAM) 50 mg tablet Take 1 tablet (50 mg total) by mouth every 6 (six) hours as  needed for Pain. 10/3/23  Yes Nick Gutierrez II, MD   TRULICITY 3 mg/0.5 mL pen injector Inject 3 mg into the skin once a week. 2/14/23  Yes Provider, Historical   GEMTESA 75 mg Tab Take 1 tablet by mouth. 9/25/23   Provider, Historical   l-methylfolate-b2-b6-b12 (CEREFOLIN) 6-5-50-1 mg Tab Take 1 tablet by mouth once daily.    Provider, Historical   quercetin 500 mg Cap Take 1 capsule by mouth Daily.    Provider, Historical   rivaroxaban (XARELTO) 15 mg (42)- 20 mg (9) tablet dose pack Take 1 tablet (15 mg) by mouth twice daily with food for 21 days followed by 1 tablet (20 mg) by mouth once daily with food 10/4/23   Nick Gutierrez II, MD   sildenafil (REVATIO) 20 mg Tab Take 20 mg by mouth 3 (three) times daily.    Provider, Historical   vitamin D (VITAMIN D3) 1000 units Tab Take 1,000 Units by mouth once daily.    Provider, Historical   zinc gluconate 50 mg tablet Take 50 mg by mouth once daily. 12/14/21   Provider, Historical       Review of Systems:  Negative except as in HPI    OBJECTIVE:     Vital Signs (Most Recent):  Temp: 98.1 °F (36.7 °C) (10/06/23 0652)  Pulse: 71 (10/06/23 0652)  Resp: 18 (10/06/23 0717)  BP: (!) 148/70 (10/06/23 0652)  SpO2: 98 % (10/06/23 0652)    Admission: Weight: 82.6 kg (182 lb) (10/04/23 1027)   Most Recent: Weight: 82.3 kg (181 lb 7 oz) (10/06/23 0717)    Physical Exam:  Vascular Physical Exam  Vitals and nursing note reviewed.   Constitutional:       General: He is awake.   Cardiovascular:      Rate and Rhythm: Normal rate and regular rhythm.      Pulses:           Radial pulses are 2+ on the right side and 2+ on the left side.        Brachial pulses are 2+ on the right side and 2+ on the left side.  Pulmonary:      Effort: Pulmonary effort is normal.      Breath sounds: Normal breath sounds and air entry.   Musculoskeletal:      Neck: Neck supple.   Neurological:      Mental Status: He is alert and oriented to person, place, and time.      Cranial Nerves: No cranial  nerve deficit.      Sensory: Sensation is intact. No sensory deficit.      Motor: Motor function is intact. No weakness.      Upper extremity:  score is 5/5 on the right side and 5/5 on the left side.              ASSESSMENT/PLAN:     68-year-old male with metastatic pancreatic cancer.  Plan is for MediPort placement today.  Procedure including risks and benefits discussed with the patient he does agree that he would like to proceed.

## 2023-10-06 NOTE — OP NOTE
[facility]  Operative Note        Surgery Date:  10/06/2023     Pre-op Diagnosis:    Malignant neoplasm of pancreas, unspecified location of malignancy [C25.9]     Post-op Diagnosis:      Procedure(s): Right IJ mediport Placement using ultrasound guidance and fluoroscopy    Indication for procedure:  Mr. Andrade is a 68-year-old male with  a diagnosed with pancreatic who requires access for chemotherapy    Surgeon:  Renan Gauthier MD    Assistant:  Circulator: Alissa Chen RN; Yuko Gracia RN  Radiology Technologist: Dale Lopez, RT  Scrub Person: Gorge Horan ST     Anesthesia:  Local MAC     Findings: Ultrasound evaluation of the right IJ noted it to patent.  Fluoroscopy noted the catheter tip to be the atriocaval junction and no kinking of the catheter.      Complications: None     Estimated Blood Loss:  5 mL         Specimens: none    Implants:  Implant Name Type Inv. Item Serial No.  Lot No. LRB No. Used Action   PORT POWER CLEAR VIEW - [id number]  PORT POWER CLEAR VIEW  [employer] [id number] Right 1 Implanted       Drains: none    Procedure in detail: After informed consent was obtained, and risks and benefits discussed with the patient, he was brought to the operating room and placed supine.  After adequate sedation was obtained, he was prepped and draped in a standard sterile fashion.  Local anesthetic was infused in the skin overlying the right IJ and it was accessed with a micropuncture needle under ultrasound guidance.  I advanced the wire down into the superior vena cava.  A tunnel was then marked on the skin and a pocket marked as well for the port.  Local anesthetic was infused in this area.  An incision was made and a pocket dissected free for the port.  I then tunneled between the port site and the vein access site and passed the catheter.  A microcatheter was then advanced over the wire.  The wire and dilator removed and an 035 wire advanced down into the SVC.  The  dilator and peel-away sheath were advanced over the wire under fluoroscopy.  The wire and dilator were then removed.  The catheter was advanced down into the right ventricle.  The peel-away sheath was cracked and removed.  The catheter was then withdrawn under fluoroscopy until it was at the atriocaval junction.  The catheter was cut to length and connected to the port mechanism.  The port was then sutured to the clavipectoral fascia using 3-0 Vicryl sutures in 2 locations.  The port was able to be aspirated and flushed well.  The incision was then closed using interrupted 3-0 Vicryl sutures for the deep layer and 4-0 Monocryl subcuticular for the skin.  The vein access site was closed with a 4-0 Monocryl subcuticular stitch as well.  Heparinized saline was infused into the port and catheter.  Dermabond was placed over the incisions.  The patient was brought to recovery in stable condition.      Condition: Good

## 2023-10-06 NOTE — PLAN OF CARE
Pt is [id number] score 9/10-he denies pain-he meets criteria for phase2 care-to rm 13 via stretcher with [id number]

## 2023-10-06 NOTE — ANESTHESIA PROCEDURE NOTES
Intubation    Date/Time: 10/6/2023 7:56 AM    Performed by: Yandel Garcia CRNA  Authorized by: Aldo Gerber MD    Intubation:     Induction:  Intravenous    Mask Ventilation:  Easy mask    Attempted By:  CRNA    Difficult Airway Encountered?: No      Airway Device:  Supraglottic airway/LMA    Airway Device Size:  4.0    Placement Verified By:  Capnometry  Notes:      Atraumatic insertion

## 2023-10-06 NOTE — ANESTHESIA POSTPROCEDURE EVALUATION
Anesthesia Post Evaluation    Patient: Alonso Andrade    Procedure(s) Performed: Procedure(s) (LRB):  Placement, Mediport (N/A)    Final Anesthesia Type: general      Patient location during evaluation: PACU  Patient participation: No - Unable to Participate, Sedation  Level of consciousness: sedated  Post-procedure vital signs: reviewed and stable  Pain management: adequate  Airway patency: patent  DULCE mitigation strategies: Multimodal analgesia  PONV status at discharge: No PONV  Anesthetic complications: no      Cardiovascular status: stable  Respiratory status: nasal cannula  Hydration status: euvolemic  Follow-up not needed.          Vitals Value Taken Time   BP 90/50 10/06/23 0834   Temp 36.5 10/06/23 0834   Pulse 56 10/06/23 0834   Resp 18 10/06/23 0834   SpO2 **99 10/06/23 0834         No case tracking events are documented in the log.      Pain/Rosemary Score: No data recorded

## 2023-10-06 NOTE — DISCHARGE SUMMARY
[facility] Surgical - Periop Services  Discharge Note  Short Stay    Procedure(s) (LRB):  Placement, Mediport (N/A)      OUTCOME: Patient tolerated treatment/procedure well without complication and is now ready for discharge.    DISPOSITION: Home or Self Care    FINAL DIAGNOSIS:  <principal problem not specified>    FOLLOWUP: None    DISCHARGE INSTRUCTIONS:  No discharge procedures on file.     TIME SPENT ON DISCHARGE:  minutes

## 2023-10-10 NOTE — TELEPHONE ENCOUNTER
Post op call: s/p mediport insertion. Patient states doing well, no issues. Follow up as needed. Advised patient to call the office with any questions or concerns.

## 2023-10-10 NOTE — PROGRESS NOTES
Evette and I met with Alonso, his wife and their son in law for his patient education appointment. We discussed his emotional wellbeing and his concerns. Alonso stated he has come to terms with his diagnosis and is accepting of his diagnosis. He did inquire about ways of presenting his diagnosis to family members and work members.  We provided Alonso with a couple of resources for Alonso to identify the best approach for his family members to know about his diagnosis. Evette showed him the infusion room.

## 2023-10-10 NOTE — PROGRESS NOTES
THERAPY EDUCATION: FOLFIRNOX    Subjective:      Patient ID: Alonso Andrade is a 68 y.o. male.    Chief Complaint: Therapy Education     Diagnosis:  Stage IV pancreatic cancer with biopsy proven liver mets.   Bilateral lower extremity DVTs    Current Treatment:   Likely FOLFIRINOX to start tentatively on 10/17/23, pending MD Chicas consultation.    Treatment History  N/A    HPI:  68-year-old male who presented to the emergency department at [facility] on 09/07/2023 with difficulty taking a deep breath and some shortness of breath after having gone on a trip.  A CT angiogram of the chest was performed and showed no evidence of pulmonary embolus, however it did mentioned scattered hepatic hypodensities suspicious for metastatic disease.  CT of the abdomen and pelvis was recommended using hepatic mass protocol.  The patient then underwent a CT of the abdomen and pelvis with and without contrast on 09/08/2023 at [facility] that showed a 2.1 cm mass in the tail of the pancreas representing primary pancreatic neoplasm versus metastatic disease with diffuse hepatic metastatic disease seen as well.  The patient was then referred to Dr. Alfonso Ewing underwent an EUS on 09/28/2023, this revealed multiple metastatic lesions in the left lobe of the liver that were biopsied, there was a mass in the pancreatic tail that was biopsied.  This was ultrasonographically staged as a uT3, N0, M1 stage IV.  Pathology from the biopsies proved poorly differentiated adenocarcinoma with signet ring cells features from the pancreas and also the liver.  The patient was referred to me.  I saw him on 10/03/2023.  At that visit, he stated to have bilateral leg swelling and pain.  He also had some twinges in his abdomen that got better with water.    Interval History:   10/10/23: Mr. Andrade presents to the clinic accompanied by family for therapy education. Patient stated no major complaints at today's visit. Denies fever, chills,  SOB, N/V/D, constipation, recent infection, chest pain or unexplained bleeding or bruising.        Past Medical History:   Diagnosis Date    Abnormal abdominal ultrasound     Abnormal findings on diagnostic imaging of abdomen     Asthma     Cancer     liver and pancreas    Cataracts, bilateral     Diabetes mellitus     DVT femoral (deep venous thrombosis) with thrombophlebitis, bilateral 10/03/2023    Gallstones     High blood sugar     continuous blood sugar monitoring device    History of prostate cancer     Hyperlipidemia     Hypertension     Left sided abdominal pain     DULCE on CPAP     Other intra-abdominal and pelvic swelling, mass and lump       Past Surgical History:   Procedure Laterality Date    BLEPHAROPLASTY Bilateral 12/05/2022    Procedure: SPLIT CASE - ENDOBROW WITH TINES; BILATERAL UPPER BLEPHS;  Surgeon: Hi Dos Santos MD;  Location: [facility] OR;  Service: ENT;  Laterality: Bilateral;    BROW LIFT N/A 12/05/2022    Procedure: RHYTIDECTOMY, FOREHEAD;  Surgeon: Hi Dos Santos MD;  Location: [facility] OR;  Service: ENT;  Laterality: N/A;    CARDIAC CATHETERIZATION  2018    very minimal blockage    CATARACT EXTRACTION Bilateral     COLONOSCOPY W/ POLYPECTOMY  04/25/2022    [facility]     ENDOSCOPIC ULTRASOUND OF UPPER GASTROINTESTINAL TRACT N/A 09/28/2023    Procedure: UPPER EUS W/  FNA;  Surgeon: Alfonso Ewing MD;  Location: [facility] OR;  Service: Gastroenterology;  Laterality: N/A;    ESOPHAGOGASTRODUODENOSCOPY N/A 09/28/2023    Procedure: EGD (ESOPHAGOGASTRODUODENOSCOPY);  Surgeon: Alfonso Ewing MD;  Location: [facility] OR;  Service: Gastroenterology;  Laterality: N/A;    NASAL SEPTUM SURGERY      PLACEMENT, MEDIPORT N/A 10/6/2023    Procedure: Placement, Mediport;  Surgeon: Renan Gauthier MD;  Location: [facility] OR;  Service: Peripheral Vascular;  Laterality: N/A;    PROSTATECTOMY      SHOULDER ARTHROSCOPY      VASECTOMY       Social History     Socioeconomic History    Marital status:     Tobacco Use    Smoking status: Never    Smokeless tobacco: Never   Substance and Sexual Activity    Alcohol use: Yes     Alcohol/week: 3.0 standard drinks of alcohol     Types: 1 Glasses of wine, 1 Cans of beer, 1 Shots of liquor per week     Comment: once a month    Drug use: Never      Family History   Problem Relation Age of Onset    Cancer Mother     Hypertension Mother     Asthma Mother     Cancer Father     Hypertension Father     COPD Father     Emphysema Father     Cancer Brother     Stroke Maternal Grandfather     Diabetes Maternal Grandfather       Review of patient's allergies indicates:  No Known Allergies   Review of Systems   Constitutional:  Negative for chills, diaphoresis, fatigue, fever and unexpected weight change.   HENT:  Negative for nasal congestion, mouth sores, sinus pressure/congestion and sore throat.    Eyes:  Negative for pain and visual disturbance.   Respiratory:  Negative for cough, chest tightness and shortness of breath.    Cardiovascular:  Positive for leg swelling (With pain bilaterally). Negative for chest pain and palpitations.   Gastrointestinal:  Negative for abdominal distention, abdominal pain, blood in stool, constipation and diarrhea.   Genitourinary:  Negative for dysuria, frequency and hematuria.   Musculoskeletal:  Negative for arthralgias and back pain.   Integumentary:  Negative for rash.   Neurological:  Negative for dizziness, weakness, numbness and headaches.   Hematological:  Negative for adenopathy.   Psychiatric/Behavioral:  Negative for confusion.        Objective:     Assessment:   Stage IV pancreatic cancer with liver mets.   Bilateral lower extremity DVTs  Plan:   -Mediport placement completed on 10/6/23. Lidocaine cream given with instructions to apply on port 30 minutes before treatment.   -Therapy education completed on 10/10/23.  -Plans to start FOLFIRINOX tentatively on 10/17/23. Made patient aware that she will receive premedications given 30 minutes  prior to each treatment to help prevent nausea. Patient understood that she will be going home with a 5FU pump for 2 days and will need to return to the clinic to have pump disconnected. Patient aware the importance of monitoring the pump 3 to 4 times a day to check for leaks or kinks. Patient also aware of the importance of avoiding cold drinks and cold food on the day of and 2 days after treatment with Oxaliplatin.  Patient understood that he will have to RTC on Day 4 of each treatment to receive Neulasta injection    -Genetic counseling scheduled with Dr. Mary for 10/31/23.  -Antiemetics regimen schedule made and given with calendar for guidance     Dexamethasone- Take 2 tablets by mouth on Days 2 and 3 of each chemo cycle   -Zofran PRN prescribed for at home with instructions to be taken by mouth every 8 hours as needed for nausea. If not working, Compazine can be prescribed as 2nd choice.    -OTC Imodium AD recommended for diarrhea (4-6 BMs a day). Take 2 tablets after the first loose bowel movement, and 1 tablet after each loose bowel movement after the first dose has been taken. No more than 4 tablets should be taken in any 24-hour period. If not working, Lomotil can be prescribed as 2nd choice.    -Mouth sore prevention with 1-quart warm water with 1 tsp of baking soda and salt and alcohol-free mouthwash.   -Emphasized adequate hand-hygiene and limited contact with people who are sick.   -Monitor and notify any bleeding in urine, stool, or sputum.  As well as unusual bleeding or bruising and stomach pain.   - Emphasized hydration with 4 16 oz bottles of water a day.    -Importance of moisturizing with fragrance free lotion to prevent skin rash and/or hand-foot syndrome.  -Continue Xarelto 1 tablet (15 mg) by mouth twice daily with food for 21 days followed by 1 tablet (20 mg) by mouth once daily with food.  -Continue Tramadol 1 tablet (50 mg total) by mouth every 6 (six) hours as needed for pain  -Call  clinic if fever >100.4, shakes or chills, shortness of breath, chest pain, uncontrolled vomiting or diarrhea, pain and tingling in the chest or arm, or just not feeling well.    - Plans to RTC on 10/30/23 for same day labs and toxicity check with MD.      DISCUSSION:    1.  A total of 60 minutes were spent in counseling today, in which 100% were face-to-face.  At today's therapy teaching session, we discussed the patient's cancer diagnosis as well as planned therapy regimen, protocol, side effects and toxicities.  A handout of each therapeutic agent in the regimen was provided and reviewed in detail.    2.  The following side effects were discussed but not limited to:    Fluorouracil Side Effects:  Allergic Reactions  Breathing Problems  Bruising  Chest Pain  Chills  Cough  Diarrhea  Fever  Hair Loss  Headache  Infection  Itching  Low Blood Counts  Mouth Sores  Nausea  Numbness  Pain  Rash  Stomach Upset  Swelling  Tingling  Vomiting    Leucovorin (Injection) Side Effects:  Allergic Reactions  Breathing Problems  Itching  Rash  Swelling    Irinotecan Side Effects:  Allergic Reactions  Bruising  Chest Pain  Chills  Constipation  Cough  Diarrhea  Fever  Flushing  Hair Loss  Headache  Infection  Itching  Low Blood Counts  Mouth Sores  Nausea  Pain  Rash  Runny Nose  Swelling  Vomiting    Oxaliplatin Side Effects:  Allergic Reactions  Anemia  Breathing Problems  Bruising  Chest Pain  Chills  Cough  Diarrhea  Dizziness  Feeling Faint  Fever  Gas  Hair Loss  Infection  Injury  Itching  Low Blood Counts  Mouth Sores  Muscle Pain  Nausea  Numbness  Pain  Rash  Swelling  Tingling  Vomiting                a.  Discussed the risk of infection while on therapy related to pancytopenia, specifically a decrease in their white blood cell count.  Instructed to contact our office for temperature >100.4 F, chills, sudden onset cough or shortness of breath, symptoms of a urinary tract infection.                b.  Discussed the risk of  anemia. Instructed to contact our office for dizziness, heart palpitations, or extreme or sudden changes in weakness.                c.  Discussed the risk of thrombocytopenia, which increases the risk of bruising or bleeding.  Instructed the patient to contact our office for spontaneous signs of bleeding, including nose bleeds, bleeding from the gums or mouth, blood in sputum, urine or stool and unusual or excessive bruising or rash.                d.  Discussed GI side effects including weight changes, changes in appetite, altered sense of taste, stomatitis, nausea, vomiting, diarrhea, constipation, and heartburn.                e.  Discussed  side effects including painful urination, changes in the amount of urination, possible urine color changes.  Discussed fertility issues and to prevent  pregnancy if of child bearing age.                f.  Discussed neurological side effects including the risk of peripheral neuropathy, either temporary or permanent.                g.  Discussed the potential for skin, hair, and nail changes.       3.  Instructed to contact our office for discussion of medication changes, the addition of vitamin and/or herbal supplementation as they may interact with some chemotherapy agents.    4.  Discussed dietary modifications and the need to maintain adequate caloric intake and proper oral hydration.  Recommended 64 ounces of fluid per day.    5.  Discussed anti-emetic protocol and bowel regimen protocol.    6.  Office contact information given including after hours number.  Discussed there is an oncologist on call 24/7, 365 days including weekends.  Provided primary nurse's information .    7.  In summary, the patient is in agreement with the plan of care.  Questions appeared to be answered to their satisfaction. Consented the patient to the treatment plan and the patient was educated on the planned duration of the treatment and schedule of the treatment administration. Copy to be  scanned into the chart.    All questions answered to the satisfaction of the patient and family.     Follow up appointments given to mayela SOLOMON-DEVENDRA  PATIENT EDUCATOR  [facility]

## 2023-10-17 NOTE — PLAN OF CARE
C1 D1 Folfirinox given. Tolerated well. Pump started at 1420. Instructed to return Thursday at 1200 to DC pump and for Udenyca. Verbalized understanding. Dc'd home in stable condition.

## 2023-10-20 NOTE — PROGRESS NOTES
Subjective:       Patient ID: Alonso Andrade is a 68 y.o. male.    Chief Complaint: Follow-up (No concerns today)      Diagnosis:  Stage IV pancreatic cancer with biopsy proven liver mets.   Bilateral lower extremity DVTs  Pulmonary embolism    Current Treatment:   FOLFIRINOX started on 10/17/2023    Treatment History:  N/A    HPI:  68-year-old male who presented to the emergency department at [facility] on 09/07/2023 with difficulty taking a deep breath and some shortness of breath after having gone on a trip.  A CT angiogram of the chest was performed and showed no evidence of pulmonary embolus, however it did mentioned scattered hepatic hypodensities suspicious for metastatic disease.  CT of the abdomen and pelvis was recommended using hepatic mass protocol.  The patient then underwent a CT of the abdomen and pelvis with and without contrast on 09/08/2023 at [facility] that showed a 2.1 cm mass in the tail of the pancreas representing primary pancreatic neoplasm versus metastatic disease with diffuse hepatic metastatic disease seen as well.  The patient was then referred to Dr. Alfonso caldwell, underwent an EUS on 09/28/2023, this revealed multiple metastatic lesions in the left lobe of the liver that were biopsied, there was a mass in the pancreatic tail that was biopsied.  This was ultrasonographically staged as a uT3, N0, M1 stage IV.  Pathology from the biopsies proved poorly differentiated adenocarcinoma with signet ring cells features from the pancreas and also the liver.  The patient was referred to me.  I saw him on 10/03/2023.  At that visit, he stated to have bilateral leg swelling and pain.  He also had some twinges in his abdomen that got better with water.  Bilateral lower extremity ultrasound done on 10/03/2023 showed right and left lower extremity DVT's, started on Xarelto.  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  Pharmacogenetic testing  done showed that the patient was a normal metabolizer of DPYD in UGT1A1.  Patient seen at [facility] by Dr. Kathryn Gilbert on 10/11/2023, they agreed with treating with FOLFIRINOX.  CT scan of the chest, abdomen, and pelvis on 10/12/2023 at [facility] showed pulmonary embolism in the right upper lobe, primary mass in the tail of the pancreas, numerous liver metastases, lymph nodes in the abdomen that are probably related to metastatic disease with indeterminate pulmonary nodules.  FOLFIRINOX started on 10/17/2023.    Interval History:   Patient presents to clinic for scheduled follow up appointment.  He would multiple side effects from FOLFIRINOX.  He stated that overall, he had cold sensitivity, feelings of fatigue, abdominal bloating, and multiple other side effects that made his time in between treatment very tough.      Past Medical History:   Diagnosis Date    Abnormal abdominal ultrasound     Abnormal findings on diagnostic imaging of abdomen     Asthma     Cancer     liver and pancreas    Cataracts, bilateral     Diabetes mellitus     DVT femoral (deep venous thrombosis) with thrombophlebitis, bilateral 10/03/2023    Gallstones     High blood sugar     continuous blood sugar monitoring device    History of prostate cancer     Hyperlipidemia     Hypertension     Left sided abdominal pain     DULCE on CPAP     Other intra-abdominal and pelvic swelling, mass and lump       Past Surgical History:   Procedure Laterality Date    BLEPHAROPLASTY Bilateral 12/05/2022    Procedure: SPLIT CASE - ENDOBROW WITH TINES; BILATERAL UPPER BLEPHS;  Surgeon: Hi Dos Santos MD;  Location: [facility] OR;  Service: ENT;  Laterality: Bilateral;    BROW LIFT N/A 12/05/2022    Procedure: RHYTIDECTOMY, FOREHEAD;  Surgeon: Hi Dos Santos MD;  Location: [facility] OR;  Service: ENT;  Laterality: N/A;    CARDIAC CATHETERIZATION  2018    very minimal blockage    CATARACT EXTRACTION Bilateral     COLONOSCOPY W/ POLYPECTOMY  04/25/2022    [facility]  [place]     ENDOSCOPIC ULTRASOUND OF UPPER GASTROINTESTINAL TRACT N/A 09/28/2023    Procedure: UPPER EUS W/  FNA;  Surgeon: Alfonso Ewing MD;  Location: [facility] OR;  Service: Gastroenterology;  Laterality: N/A;    ESOPHAGOGASTRODUODENOSCOPY N/A 09/28/2023    Procedure: EGD (ESOPHAGOGASTRODUODENOSCOPY);  Surgeon: Alfonso Ewing MD;  Location: [facility] OR;  Service: Gastroenterology;  Laterality: N/A;    NASAL SEPTUM SURGERY      PLACEMENT, MEDIPORT N/A 10/6/2023    Procedure: Placement, Mediport;  Surgeon: Renan Gauthier MD;  Location: [facility] OR;  Service: Peripheral Vascular;  Laterality: N/A;    PROSTATECTOMY      SHOULDER ARTHROSCOPY      VASECTOMY       Social History     Socioeconomic History    Marital status:    Tobacco Use    Smoking status: Never    Smokeless tobacco: Never   Substance and Sexual Activity    Alcohol use: Yes     Alcohol/week: 3.0 standard drinks of alcohol     Types: 1 Glasses of wine, 1 Cans of beer, 1 Shots of liquor per week     Comment: once a month    Drug use: Never      Family History   Problem Relation Age of Onset    Cancer Mother     Hypertension Mother     Asthma Mother     Cancer Father     Hypertension Father     COPD Father     Emphysema Father     Cancer Brother     Stroke Maternal Grandfather     Diabetes Maternal Grandfather       Review of patient's allergies indicates:  No Known Allergies   Review of Systems   Constitutional:  Positive for fatigue. Negative for chills, diaphoresis, fever and unexpected weight change.   HENT:  Negative for nasal congestion, mouth sores, sinus pressure/congestion and sore throat.    Eyes:  Negative for pain and visual disturbance.   Respiratory:  Negative for cough, chest tightness and shortness of breath.    Cardiovascular:  Positive for leg swelling (With pain bilaterally). Negative for chest pain and palpitations.   Gastrointestinal:  Negative for abdominal distention, abdominal pain, blood in stool, constipation and diarrhea.    Endocrine: Positive for cold intolerance.   Genitourinary:  Negative for dysuria, frequency and hematuria.   Musculoskeletal:  Negative for arthralgias and back pain.   Integumentary:  Negative for rash.   Neurological:  Negative for dizziness, weakness, numbness and headaches.        Cold sensitivity secondary to oxaliplatin   Hematological:  Negative for adenopathy.   Psychiatric/Behavioral:  Negative for confusion.          Objective:      Physical Exam  Vitals reviewed.   Constitutional:       General: He is awake.      Appearance: Normal appearance.   HENT:      Head: Normocephalic and atraumatic.      Right Ear: Hearing normal.      Left Ear: Hearing normal.      Nose: Nose normal.   Eyes:      General: Lids are normal. Vision grossly intact.      Extraocular Movements: Extraocular movements intact.      Conjunctiva/sclera: Conjunctivae normal.   Cardiovascular:      Rate and Rhythm: Normal rate and regular rhythm.      Pulses: Normal pulses.      Heart sounds: Normal heart sounds.   Pulmonary:      Effort: Pulmonary effort is normal.      Breath sounds: Normal breath sounds. No wheezing, rhonchi or rales.   Abdominal:      General: Bowel sounds are normal.      Palpations: Abdomen is soft.      Tenderness: There is no abdominal tenderness.   Musculoskeletal:      Cervical back: Full passive range of motion without pain.      Right lower leg: Edema (painful) present.      Left lower leg: Edema (painful) present.   Lymphadenopathy:      Cervical: No cervical adenopathy.      Upper Body:      Right upper body: No supraclavicular or axillary adenopathy.      Left upper body: No supraclavicular or axillary adenopathy.   Skin:     General: Skin is warm.   Neurological:      General: No focal deficit present.      Mental Status: He is alert and oriented to person, place, and time.   Psychiatric:         Attention and Perception: Attention normal.         Mood and Affect: Mood and affect normal.         Behavior:  Behavior is cooperative.         LABS AND IMAGING REVIEWED IN EPIC          Assessment:   Stage IV pancreatic cancer with biopsy proven liver mets.   Bilateral lower extremity DVTs  Pulmonary embolism        Plan:       I recommended FOLFIRINOX when I 1st saw the patient, he met with MD Chicas on 10/11/2023, they also recommended FOLFIRINOX.    FOLFIRINOX started on 10/17/2023.  I will add 10% dose reduction done on 10/30/2023 with cycle 2 due to intolerance.    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    Pharmacogenetic testing done showed that he was normal metabolizer of DPYD [id number].    Patient was diagnosed with bilateral DVTs on 10/03/2023, CT scan of the chest on 10/12/2023 showed PE, he was on Xarelto.    Continue Xarelto as prescribed    Continue Tramadol as needed    Referral for medical marijuana sent to [facility]    Okay to proceed with treatment today     Return to clinic in 2 weeks for TD visit, same day labs    I spent 30 minutes in the patient's room, we also spent about 30-40 minutes going through and discussing the patient's medical diarrhea that he created with him on the phone.    Nick Gutierrez II, MD I, Татьяна Sanches LPN, acted solely as a scribe for and in the presence of, Dr. Nick Gutierrez who performed the service.

## 2023-10-26 NOTE — TELEPHONE ENCOUNTER
Patient requesting to s/w you regarding side effects and quality of life. He does see you on Monday, but receives tx that morning. He would like to discuss some of his concerns before getting treated again. He can be reached @ [phone].

## 2023-10-30 NOTE — PROGRESS NOTES
Oncology Nutrition Assessment for Medical Nutrition Therapy      Alonso Andrade   1955    Oncology Provider:   Nick Gutierrez MD    Reason for Visit:  New Treatment Education    Oncology/Hematology Diagnosis:   Stage IV pancreatic cancer with biopsy proven liver mets    Treatment Plan:  FOLFIRINOX    Nutrition Recommendations:  1. Regular diet as tolerated. Avoid ingestion of cold foods/beverages during oxaliplatin infusion and for 4-5 days following.   2. Discussed limiting added sugar intake given pt hx of diabetes. He follows with endocrinology. Meal plan and info on sugar and cancer printed and given today.    Nutrition Assessment    10/30/23: This is a 68 y.o.male with a medical diagnosis of stg IV pancreatic CA. He is here for start of cycle two of chemo. He reports good appetite and po intake, no c/o n/v/c/d at this time. However, he did have cold sensitivity and gas/bloating after first cycle of tx. He notes his overall physical activity has decreased in the past several weeks with everything going on, but he still tries to remain active on a daily basis. He has lost ~8# in the past month.    Nutrition Factors Affecting Intake  abdominal distension and cold sensitivity    PMHx: DVT, DM, HLD, HTN    Allergies: Patient has no known allergies.    Current Medications:    Current Outpatient Medications:     amLODIPine (NORVASC) 2.5 MG tablet, Take 2.5 mg by mouth once daily., Disp: , Rfl:     ascorbic acid, vitamin C, (VITAMIN C) 1000 MG tablet, Take 1,000 mg by mouth once daily., Disp: , Rfl:     aspirin 81 MG Chew, Take 81 mg by mouth once daily., Disp: , Rfl:     cyanocobalamin, vitamin B-12, 2,000 mcg Tab, Take by mouth once daily., Disp: , Rfl:     dexAMETHasone (DECADRON) 4 MG Tab, Take 2 tablets (8 mg total) by mouth once daily. Take as directed on days 2 and 3 of your chemotherapy cycle., Disp: 24 tablet, Rfl: 5    empagliflozin (JARDIANCE) 25 mg tablet, Take 25 mg by mouth once daily., Disp: ,  Rfl:     GEMTESA 75 mg Tab, Take 1 tablet by mouth., Disp: , Rfl:     l-methylfolate-b2-b6-b12 (CEREFOLIN) 6-5-50-1 mg Tab, Take 1 tablet by mouth once daily., Disp: , Rfl:     losartan-hydrochlorothiazide 100-25 mg (HYZAAR) 100-25 mg per tablet, Take 1 tablet by mouth once daily., Disp: , Rfl:     metFORMIN (GLUCOPHAGE) 500 MG tablet, Take 1 tablet by mouth 2 (two) times daily with meals., Disp: , Rfl:     ondansetron (ZOFRAN-ODT) 8 MG TbDL, Take 1 tablet (8 mg total) by mouth every 8 (eight) hours as needed (nausea/vomiting)., Disp: 60 tablet, Rfl: 5    quercetin 500 mg Cap, Take 1 capsule by mouth Daily., Disp: , Rfl:     rivaroxaban (XARELTO) 15 mg (42)- 20 mg (9) tablet dose pack, Take 1 tablet (15 mg) by mouth twice daily with food for 21 days followed by 1 tablet (20 mg) by mouth once daily with food (Patient not taking: Reported on 10/10/2023), Disp: 1 each, Rfl: 0    rosuvastatin (CRESTOR) 40 MG Tab, Take 40 mg by mouth once daily., Disp: , Rfl:     sildenafil (REVATIO) 20 mg Tab, Take 20 mg by mouth 3 (three) times daily., Disp: , Rfl:     TOUJEO SOLOSTAR U-300 INSULIN 300 unit/mL (1.5 mL) InPn pen, Inject 85 Units into the skin once daily., Disp: , Rfl:     traMADoL (ULTRAM) 50 mg tablet, Take 1 tablet (50 mg total) by mouth every 6 (six) hours as needed for Pain., Disp: 120 tablet, Rfl: 0    TRULICITY 3 mg/0.5 mL pen injector, Inject 3 mg into the skin once a week., Disp: , Rfl:     vitamin D (VITAMIN D3) 1000 units Tab, Take 1,000 Units by mouth once daily., Disp: , Rfl:     zinc gluconate 50 mg tablet, Take 50 mg by mouth once daily., Disp: , Rfl:   No current facility-administered medications for this visit.    Facility-Administered Medications Ordered in Other Visits:     alteplase injection 2 mg, 2 mg, Intra-Catheter, PRN, Nick Gutierrez II, MD    atropine injection 0.4 mg, 0.4 mg, Intravenous, Once PRN, Nick Gutierrez II, MD    dextrose 5 % (D5W) 250 mL flush bag, , Intravenous, 1 time in  Clinic/HOD, Nick Gutierrez II, MD    diphenhydrAMINE injection 50 mg, 50 mg, Intravenous, Once PRN, Nick Gutierrez II, MD    EPINEPHrine (EPIPEN) 0.3 mg/0.3 mL pen injection 0.3 mg, 0.3 mg, Intramuscular, Once PRN, Nick Gutierrez II, MD    fluorouracil (ADRUCIL) 4,400 mg in sodium chloride 0.9% 138 mL chemo infusion, 4,400 mg, Intravenous, over 46 hr, Nick Gutierrez II, MD, 4,400 mg at 10/30/23 1435    heparin, porcine (PF) 100 unit/mL injection flush 500 Units, 500 Units, Intravenous, PRN, Nick Gutierrez II, MD    hydrocortisone sodium succinate injection 100 mg, 100 mg, Intravenous, Once PRN, Nick Gutierrez II, MD    sodium chloride 0.9% 100 mL flush bag, , Intravenous, 1 time in Clinic/HOD, Nick Gutierrez II, MD    sodium chloride 0.9% flush 10 mL, 10 mL, Intravenous, PRN, Nick Gutierrez II, MD    Labs: 10/30/23 alk phos 612 (H), AST 72 (H), ALT 96 (H)    Anthropometrics    Height:   Ht Readings from Last 1 Encounters:   10/30/23 6' (1.829 m)      Weight:   Wt Readings from Last 5 Encounters:   10/30/23 80.8 kg (178 lb 3.9 oz)   10/30/23 80.8 kg (178 lb 3.9 oz)   10/17/23 83.4 kg (183 lb 12.8 oz)   10/10/23 82.1 kg (181 lb)   10/06/23 82.3 kg (181 lb 7 oz)        Usual Body Weight: 84.5 kg (186 lb)  % Weight Change: -4% in 1 month    BMI: 24.1 (normal)    Ideal Weight: 80.9 kg (178 lb)   % Ideal Weight: 100%    Nutrition Diagnosis    PES: Food and nutrition related knowledge deficit related to chronic illness as evidenced by lack of prior exposure to oncology nutrition. (resolved)     Nutrition Risk  low    Nutrition Intervention    Interventions(treatment strategy):  modified composition of meals/snacks and purpose of nutrition education    Nutrition Education    Education Provided: food safety guidelines, general healthy diet, and oxaliplatin nutrition therapy  Teaching Method: explanation and printed materials  Comprehension: verbalizes understanding  Barriers to Learning: none  evident  Expected Compliance: good  Comments: All questions were answered and dietitian's contact information was provided.    Nutrition Monitoring and Evaluation    Ongoing nutrition monitoring not warranted at this time. Please consult RD prn.       Itzel Head MS, RD, , LDN

## 2023-10-31 NOTE — PROGRESS NOTES
REFERRING PROVIDER: Dr. Nick Gutierrez      Patient ID: Alonso Andrade is a 68 y.o. male.    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 Patient presented via Telemedicine Visit (audio and video) today for risk assessment, genetic counseling, and consideration for genetic testing.    HPI  Past Medical History:   Diagnosis Date    Abnormal abdominal ultrasound     Abnormal findings on diagnostic imaging of abdomen     Asthma     Cancer     liver and pancreas    Cataracts, bilateral     Diabetes mellitus     DVT femoral (deep venous thrombosis) with thrombophlebitis, bilateral 10/03/2023    Gallstones     High blood sugar     continuous blood sugar monitoring device    History of prostate cancer     Hyperlipidemia     Hypertension     Left sided abdominal pain     DULCE on CPAP     Other intra-abdominal and pelvic swelling, mass and lump         Past Surgical History:   Procedure Laterality Date    BLEPHAROPLASTY Bilateral 12/05/2022    Procedure: SPLIT CASE - ENDOBROW WITH TINES; BILATERAL UPPER BLEPHS;  Surgeon: Hi Dos Santos MD;  Location: [facility] OR;  Service: ENT;  Laterality: Bilateral;    BROW LIFT N/A 12/05/2022    Procedure: RHYTIDECTOMY, FOREHEAD;  Surgeon: Hi Dos Santos MD;  Location: [facility] OR;  Service: ENT;  Laterality: N/A;    CARDIAC CATHETERIZATION  2018    very minimal blockage    CATARACT EXTRACTION Bilateral     COLONOSCOPY W/ POLYPECTOMY  04/25/2022    [facility]     ENDOSCOPIC ULTRASOUND OF UPPER GASTROINTESTINAL TRACT N/A 09/28/2023    Procedure: UPPER EUS W/  FNA;  Surgeon: Alfonso Ewing MD;  Location: [facility] OR;  Service: Gastroenterology;  Laterality: N/A;    ESOPHAGOGASTRODUODENOSCOPY N/A 09/28/2023    Procedure: EGD (ESOPHAGOGASTRODUODENOSCOPY);  Surgeon: Alfonso Ewing MD;  Location: [facility] OR;  Service: Gastroenterology;  Laterality: N/A;     NASAL SEPTUM SURGERY      PLACEMENT, MEDIPORT N/A 10/06/2023    Procedure: Placement, Mediport;  Surgeon: Renan Gauthier MD;  Location: [facility];  Service: Peripheral Vascular;  Laterality: N/A;    PROSTATECTOMY  2007    SHOULDER ARTHROSCOPY      VASECTOMY          Review of patient's allergies indicates:  Patient has no known allergies.     Review of Systems        Problem List Items Addressed This Visit    None       Oncology History    No history exists.      Family History   Problem Relation Age of Onset    Colon cancer Mother     Hypertension Mother     Asthma Mother     Leukemia Father     Hypertension Father     COPD Father     Emphysema Father     Colon cancer Brother 40    Stroke Maternal Grandfather     Diabetes Maternal Grandfather             Assessment:   Risk Assessment:  This patient is at increased risk of having an inherited genetic mutation that increases the risk for cancer. Patient meets criteria for genetic testing based on the National Comprehensive Cancer Network (NCCN) criteria due to a personal history of prostate cancer and a current diagnosis of pancreatic cancer whereas genetic test result could influence treatment, with additional evidence due to family history that includes colon cancer diagnosed under 50y (brother dx40y, mother) (see family history and pedigree). Based on the likelihood of having a mutation, BRCA1/2 Analysis with [employer] Cancer[employer]-Expanded+[employer] panel testing was described in detail.    Education and Counseling:  [employer] Cancer[employer]-Expanded evaluates a broad number of hereditary cancer syndromes to help define patients' cancer risk. This cancer panel tests (77 total): AIP, ALK, APC*, ANGELA*, AXIN2, BAP1, BARD1, BLM, BMPR1A, BRCA1*, BRCA2*, BRIP1*, CDC73, CDH1*, CDK4, CDKN1B, CDKN2A, CHEK2*, CTNNA1, DICER1, FANCC, FH, FLCN, GALNT12, KIF1B, LZTR1, MAX, MEN1, MET, MLH1*, MSH2*, MSH3, MSH6*, MUTYH*, NBN, NF1*, NF2, NTHL1, PALB2*, PHOX2B, PMS2*, POT1,  AASXS2K, PTCH1, PTEN*, RAD51C*, RAD51D*, RB1, RECQL, RET, SDHA, SDHAF2, SDHB, SDHC, SDHD, SMAD4, SMARCA4, SMARCB1, SMARCE1, STK11, SUFU, EMZY700, TP53*, TSC1, TSC2, VHL and XRCC2 (sequencing and deletion/duplication); EGFR, EGLN1, HOXB13, KIT, MITF, PDGFRA, POLD1 and POLE (sequencing only); EPCAM and GREM1 (deletion/duplication only). DNA and RNA analyses performed for * genes.    Risks of cancer associated with inherited cancer predisposition mutations were discussed in detail.  If a mutation were found, this patient would have a significantly increased risk for cancer.  Inherited cancer syndromes included in this test, may have different, but still significant risk for cancer.  Risk of cancer with any particular gene mutation will be discussed at the time of results disclosure and based on the results.    The availability of clinical management options for inherited cancer predisposition mutation carriers was discussed. Details of the testing process, including benefits and limitations of genetic analysis as well as the implications of possible test results, were discussed.  Because this patient is the first member of the family to be tested comprehensive panel testing was presented.  Related insurance issues were discussed.      Summary:  This patient was evaluated for hereditary risk of cancer and was found to be at an increased risk of having an inherited cancer predisposition gene mutation.  The option of genetic testing was explained in detail, including the possible impact of this information on family members.  Since this patient wishes to proceed with testing an informed consent was obtained and blood drawn and sent to [facility].  Results will be expected 4 weeks from this time.  A follow-up appointment will be scheduled for results disclosure.       The patient location is: home.     Visit type:     Face to Face time with patient: 30 minutes  >40 minutes of total time spent on the encounter, which  includes face to face time and non-face to face time preparing to see the patient (eg, review of tests), Obtaining and/or reviewing separately obtained history, Documenting clinical information in the electronic or other health record, Independently interpreting results (not separately reported) and communicating results to the patient/family/caregiver, or Care coordination (not separately reported).       Each patient to whom he or she provides medical services by telemedicine is:  (1) informed of the relationship between the physician and patient and the respective role of any other health care provider with respect to management of the patient; and (2) notified that he or she may decline to receive medical services by telemedicine and may withdraw from such care at any time.    KEYLA BENÍTEZ, PhD     Answers submitted by the patient for this visit:  Review of Systems Questionnaire (Submitted on 10/31/2023)  appetite change : Yes  unexpected weight change: Yes  mouth sores: No  visual disturbance: Yes  cough: No  shortness of breath: Yes  chest pain: Yes  abdominal pain: Yes  diarrhea: No  frequency: Yes  back pain: Yes  rash: No  headaches: No  adenopathy: Yes  nervous/ anxious: Yes

## 2023-11-01 NOTE — NURSING
CADD Pump disconnected, Mediport flushed and de-accessed, no complications. Udenyca SQ injection given 1 day early- per MD okay to give Udenyca today but going forward, injection will need to be on Day 4. Pt and wife verbalized understanding. Pt discharged home in stable condition, aware of future appts.

## 2023-11-10 NOTE — PROGRESS NOTES
Subjective:       Patient ID: Alonso Andrade is a 68 y.o. male.    Chief Complaint: Follow-up (Patient has no concerns today)      Diagnosis:  Stage IV pancreatic cancer with biopsy proven liver mets.   Bilateral lower extremity DVTs  Pulmonary embolism    Current Treatment:   FOLFIRINOX started on 10/17/2023    Treatment History:  N/A    HPI:  68-year-old male who presented to the emergency department at [facility] on 09/07/2023 with difficulty taking a deep breath and some shortness of breath after having gone on a trip.  A CT angiogram of the chest was performed and showed no evidence of pulmonary embolus, however it did mentioned scattered hepatic hypodensities suspicious for metastatic disease.  CT of the abdomen and pelvis was recommended using hepatic mass protocol.  The patient then underwent a CT of the abdomen and pelvis with and without contrast on 09/08/2023 at [facility] that showed a 2.1 cm mass in the tail of the pancreas representing primary pancreatic neoplasm versus metastatic disease with diffuse hepatic metastatic disease seen as well.  The patient was then referred to Dr. Alfonso caldwell, underwent an EUS on 09/28/2023, this revealed multiple metastatic lesions in the left lobe of the liver that were biopsied, there was a mass in the pancreatic tail that was biopsied.  This was ultrasonographically staged as a uT3, N0, M1 stage IV.  Pathology from the biopsies proved poorly differentiated adenocarcinoma with signet ring cells features from the pancreas and also the liver.  The patient was referred to me.  I saw him on 10/03/2023.  At that visit, he stated to have bilateral leg swelling and pain.  He also had some twinges in his abdomen that got better with water.  Bilateral lower extremity ultrasound done on 10/03/2023 showed right and left lower extremity DVT's, started on Xarelto.  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   Pharmacogenetic testing done showed that the patient was a normal metabolizer of DPYD in UGT1A1.  Patient seen at [facility] by Dr. Kathryn Gilbert on 10/11/2023, they agreed with treating with FOLFIRINOX.  CT scan of the chest, abdomen, and pelvis on 10/12/2023 at [facility] showed pulmonary embolism in the right upper lobe, primary mass in the tail of the pancreas, numerous liver metastases, lymph nodes in the abdomen that are probably related to metastatic disease with indeterminate pulmonary nodules.  FOLFIRINOX started on 10/17/2023.    Interval History:   Patient presents to clinic for scheduled follow up appointment.  He tolerated his last treatment much better with 10% dose reduction.  Had some side effects.  He stated to have constipation, heartburn on day 7, bruising.  He also had some decreased strength in his right arm that improved.  He has persistent abdominal discomfort/pain that he would rate a 2 to 3/10.  He states that he has decreased stamina and muscle loss.  He also has nausea and bloating.      Past Medical History:   Diagnosis Date    Abnormal abdominal ultrasound     Abnormal findings on diagnostic imaging of abdomen     Asthma     Cancer     liver and pancreas    Cataracts, bilateral     Diabetes mellitus     DVT femoral (deep venous thrombosis) with thrombophlebitis, bilateral 10/03/2023    Gallstones     High blood sugar     continuous blood sugar monitoring device    History of prostate cancer     Hyperlipidemia     Hypertension     Left sided abdominal pain     DULCE on CPAP     Other intra-abdominal and pelvic swelling, mass and lump       Past Surgical History:   Procedure Laterality Date    BLEPHAROPLASTY Bilateral 12/05/2022    Procedure: SPLIT CASE - ENDOBROW WITH TINES; BILATERAL UPPER BLEPHS;  Surgeon: Hi Dos Santos MD;  Location: [facility] OR;  Service: ENT;  Laterality: Bilateral;    BROW LIFT N/A 12/05/2022    Procedure: RHYTIDECTOMY, FOREHEAD;  Surgeon: Hi Dos Santos MD;  [facility];  Service: ENT;  Laterality: N/A;    CARDIAC CATHETERIZATION  2018    very minimal blockage    CATARACT EXTRACTION Bilateral     COLONOSCOPY W/ POLYPECTOMY  [facility]/25/2022    [facility]     ENDOSCOPIC ULTRASOUND OF UPPER GASTROINTESTINAL TRACT N/A 09/28/2023    Procedure: UPPER EUS W/  FNA;  Surgeon: Alfonso Ewing MD;  Location: [facility] OR;  Service: Gastroenterology;  Laterality: N/A;    ESOPHAGOGASTRODUODENOSCOPY N/A 09/28/2023    Procedure: EGD (ESOPHAGOGASTRODUODENOSCOPY);  Surgeon: Alfonso Ewing MD;  Location: [facility] OR;  Service: Gastroenterology;  Laterality: N/A;    NASAL SEPTUM SURGERY      PLACEMENT, MEDIPORT N/A 10/06/2023    Procedure: Placement, Mediport;  Surgeon: Renan Gauthier MD;  Location: [facility] OR;  Service: Peripheral Vascular;  Laterality: N/A;    PROSTATECTOMY  2007    SHOULDER ARTHROSCOPY      VASECTOMY       Social History     Socioeconomic History    Marital status:    Tobacco Use    Smoking status: Never    Smokeless tobacco: Never   Substance and Sexual Activity    Alcohol use: Yes     Alcohol/week: 3.0 standard drinks of alcohol     Types: 1 Glasses of wine, 1 Cans of beer, 1 Shots of liquor per week     Comment: once a month    Drug use: Never      Family History   Problem Relation Age of Onset    Colon cancer Mother     Hypertension Mother     Asthma Mother     Leukemia Father     Hypertension Father     COPD Father     Emphysema Father     Colon cancer Brother 40    Stroke Maternal Grandfather     Diabetes Maternal Grandfather       Review of patient's allergies indicates:  No Known Allergies   Review of Systems   Constitutional:  Positive for fatigue. Negative for appetite change, chills, diaphoresis, fever and unexpected weight change.   HENT:  Negative for nasal congestion, mouth sores, sinus pressure/congestion and sore throat.    Eyes:  Negative for pain and visual disturbance.   Respiratory:  Negative for cough, chest tightness and shortness of breath.     Cardiovascular:  Positive for leg swelling (With pain bilaterally). Negative for chest pain and palpitations.   Gastrointestinal:  Negative for abdominal distention, abdominal pain, blood in stool, constipation and diarrhea.   Endocrine: Positive for cold intolerance.   Genitourinary:  Negative for dysuria, frequency and hematuria.   Musculoskeletal:  Negative for arthralgias and back pain.   Integumentary:  Negative for rash.   Neurological:  Negative for dizziness, weakness, numbness and headaches.        Cold sensitivity secondary to oxaliplatin   Hematological:  Negative for adenopathy.   Psychiatric/Behavioral:  Negative for confusion. The patient is not nervous/anxious.          Objective:      Physical Exam  Vitals reviewed.   Constitutional:       General: He is awake.      Appearance: Normal appearance.   HENT:      Head: Normocephalic and atraumatic.      Right Ear: Hearing normal.      Left Ear: Hearing normal.      Nose: Nose normal.   Eyes:      General: Lids are normal. Vision grossly intact.      Extraocular Movements: Extraocular movements intact.      Conjunctiva/sclera: Conjunctivae normal.   Cardiovascular:      Rate and Rhythm: Normal rate and regular rhythm.      Pulses: Normal pulses.      Heart sounds: Normal heart sounds.   Pulmonary:      Effort: Pulmonary effort is normal.      Breath sounds: Normal breath sounds. No wheezing, rhonchi or rales.   Abdominal:      General: Bowel sounds are normal.      Palpations: Abdomen is soft.      Tenderness: There is no abdominal tenderness.   Musculoskeletal:      Cervical back: Full passive range of motion without pain.      Right lower leg: Edema (painful) present.      Left lower leg: Edema (painful) present.   Lymphadenopathy:      Cervical: No cervical adenopathy.      Upper Body:      Right upper body: No supraclavicular or axillary adenopathy.      Left upper body: No supraclavicular or axillary adenopathy.   Skin:     General: Skin is warm.    Neurological:      General: No focal deficit present.      Mental Status: He is alert and oriented to person, place, and time.   Psychiatric:         Attention and Perception: Attention normal.         Mood and Affect: Mood and affect normal.         Behavior: Behavior is cooperative.         LABS AND IMAGING REVIEWED IN EPIC          Assessment:   Stage IV pancreatic cancer with biopsy proven liver mets.   Bilateral lower extremity DVTs  Pulmonary embolism        Plan:       I recommended FOLFIRINOX when I 1st saw the patient, he met with MD Chicas on 10/11/2023, they also recommended FOLFIRINOX.    FOLFIRINOX started on 10/17/2023.  I will continue with 10% dose reduction moving forward.    Genetic testing done on 10/31/2023.    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    Pharmacogenetic testing done showed that he was normal metabolizer of DPYD and UGT1A1.    Patient was diagnosed with bilateral DVTs on 10/03/2023, CT scan of the chest on 10/12/2023 showed PE, he was on Xarelto.    Continue Tramadol as needed- refill sent today    Zyprexa sent to pharmacy for nausea. He also has Zofran as needed    He has been referred for medical marijuana: [facility]    Okay to proceed with treatment as scheduled. Will also set up for MVI when he comes in to D/C his pump and the following week.    Return to clinic in 2 weeks for TD visit, same day labs    I spent 30 minutes in the patient's room, we also spent about 30-40 minutes going through and discussing the patient's medical diarrhea that he created with him on the phone.    Nick Gutierrez II, MD I, Татьяна Sanches LPN, acted solely as a scribe for and in the presence of, Dr. Nick Gutierrez who performed the service.

## 2023-11-17 NOTE — PROGRESS NOTES
Subjective:       Patient ID: Alonso Andrade is a 68 y.o. male.    Chief Complaint: No chief complaint on file.      Diagnosis:  Stage IV pancreatic cancer with biopsy proven liver mets.   Bilateral lower extremity DVTs  Pulmonary embolism    Current Treatment:   FOLFIRINOX started on 10/17/2023    Treatment History:  N/A    HPI:  68-year-old male who presented to the emergency department at [facility] on 09/07/2023 with difficulty taking a deep breath and some shortness of breath after having gone on a trip.  A CT angiogram of the chest was performed and showed no evidence of pulmonary embolus, however it did mentioned scattered hepatic hypodensities suspicious for metastatic disease.  CT of the abdomen and pelvis was recommended using hepatic mass protocol.  The patient then underwent a CT of the abdomen and pelvis with and without contrast on 09/08/2023 at [facility] that showed a 2.1 cm mass in the tail of the pancreas representing primary pancreatic neoplasm versus metastatic disease with diffuse hepatic metastatic disease seen as well.  The patient was then referred to Dr. Alfonso caldwell, underwent an EUS on 09/28/2023, this revealed multiple metastatic lesions in the left lobe of the liver that were biopsied, there was a mass in the pancreatic tail that was biopsied.  This was ultrasonographically staged as a uT3, N0, M1 stage IV.  Pathology from the biopsies proved poorly differentiated adenocarcinoma with signet ring cells features from the pancreas and also the liver.  The patient was referred to me.  I saw him on 10/03/2023.  At that visit, he stated to have bilateral leg swelling and pain.  He also had some twinges in his abdomen that got better with water.  Bilateral lower extremity ultrasound done on 10/03/2023 showed right and left lower extremity DVT's, started on Xarelto.  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  Pharmacogenetic testing  done showed that the patient was a normal metabolizer of DPYD in UGT1A1.  Patient seen at [facility] by Dr. Kathryn Gilbert on 10/11/2023, they agreed with treating with FOLFIRINOX.  CT scan of the chest, abdomen, and pelvis on 10/12/2023 at [facility] showed pulmonary embolism in the right upper lobe, primary mass in the tail of the pancreas, numerous liver metastases, lymph nodes in the abdomen that are probably related to metastatic disease with indeterminate pulmonary nodules.  FOLFIRINOX started on 10/17/2023.    Interval History:   Patient presents to clinic for scheduled follow up appointment.  He has received cycle 3 of FOLFIRINOX.  Overall, he tolerated this cycle better than the 2 prior.  He still has some weakness, neuropathy, cold sensitivity, and other side effects.  Did feel that the multivitamin infusion was helpful.  He has no other major issues to discuss at this time.  He will be seeing MD Juan Francisco on 12/06/2023.      Past Medical History:   Diagnosis Date    Abnormal abdominal ultrasound     Abnormal findings on diagnostic imaging of abdomen     Asthma     Cancer     liver and pancreas    Cataracts, bilateral     Diabetes mellitus     DVT femoral (deep venous thrombosis) with thrombophlebitis, bilateral 10/03/2023    Gallstones     High blood sugar     continuous blood sugar monitoring device    History of prostate cancer     Hyperlipidemia     Hypertension     Left sided abdominal pain     DULCE on CPAP     Other intra-abdominal and pelvic swelling, mass and lump       Past Surgical History:   Procedure Laterality Date    BLEPHAROPLASTY Bilateral 12/05/2022    Procedure: SPLIT CASE - ENDOBROW WITH TINES; BILATERAL UPPER BLEPHS;  Surgeon: Hi Dos Santos MD;  Location: [facility] OR;  Service: ENT;  Laterality: Bilateral;    BROW LIFT N/A 12/05/2022    Procedure: RHYTIDECTOMY, FOREHEAD;  Surgeon: Hi Dos Santos MD;  Location: [facility] OR;  Service: ENT;  Laterality: N/A;    CARDIAC CATHETERIZATION  2018    very  minimal blockage    CATARACT EXTRACTION Bilateral     COLONOSCOPY W/ POLYPECTOMY  04/25/2022    [facility]     ENDOSCOPIC ULTRASOUND OF UPPER GASTROINTESTINAL TRACT N/A 09/28/2023    Procedure: UPPER EUS W/  FNA;  Surgeon: Alfonso Ewing MD;  Location: [facility];  Service: Gastroenterology;  Laterality: N/A;    ESOPHAGOGASTRODUODENOSCOPY N/A 09/28/2023    Procedure: EGD (ESOPHAGOGASTRODUODENOSCOPY);  Surgeon: Alfonso Ewing MD;  Location: [facility] OR;  Service: Gastroenterology;  Laterality: N/A;    NASAL SEPTUM SURGERY      PLACEMENT, MEDIPORT N/A 10/06/2023    Procedure: Placement, Mediport;  Surgeon: Renan Gauthier MD;  Location: [facility];  Service: Peripheral Vascular;  Laterality: N/A;    PROSTATECTOMY  2007    SHOULDER ARTHROSCOPY      VASECTOMY       Social History     Socioeconomic History    Marital status:    Tobacco Use    Smoking status: Never    Smokeless tobacco: Never   Substance and Sexual Activity    Alcohol use: Yes     Alcohol/week: 3.0 standard drinks of alcohol     Types: 1 Glasses of wine, 1 Cans of beer, 1 Shots of liquor per week     Comment: once a month    Drug use: Never      Family History   Problem Relation Age of Onset    Colon cancer Mother     Hypertension Mother     Asthma Mother     Leukemia Father     Hypertension Father     COPD Father     Emphysema Father     Colon cancer Brother 40    Stroke Maternal Grandfather     Diabetes Maternal Grandfather       Review of patient's allergies indicates:  No Known Allergies   Review of Systems   Constitutional:  Positive for fatigue. Negative for appetite change, chills, diaphoresis, fever and unexpected weight change.   HENT:  Negative for nasal congestion, mouth sores, sinus pressure/congestion and sore throat.    Eyes:  Negative for pain and visual disturbance.   Respiratory:  Negative for cough, chest tightness and shortness of breath.    Cardiovascular:  Positive for leg swelling (With pain bilaterally). Negative for  chest pain and palpitations.   Gastrointestinal:  Negative for abdominal distention, abdominal pain, blood in stool, constipation and diarrhea.   Endocrine: Positive for cold intolerance.   Genitourinary:  Negative for dysuria, frequency and hematuria.   Musculoskeletal:  Negative for arthralgias and back pain.   Integumentary:  Negative for rash.   Neurological:  Negative for dizziness, weakness, numbness and headaches.        Cold sensitivity secondary to oxaliplatin   Hematological:  Negative for adenopathy.   Psychiatric/Behavioral:  Negative for confusion. The patient is not nervous/anxious.          Objective:      Physical Exam  Vitals reviewed.   Constitutional:       General: He is awake.      Appearance: Normal appearance.   HENT:      Head: Normocephalic and atraumatic.      Right Ear: Hearing normal.      Left Ear: Hearing normal.      Nose: Nose normal.   Eyes:      General: Lids are normal. Vision grossly intact.      Extraocular Movements: Extraocular movements intact.      Conjunctiva/sclera: Conjunctivae normal.   Cardiovascular:      Rate and Rhythm: Normal rate and regular rhythm.      Pulses: Normal pulses.      Heart sounds: Normal heart sounds.   Pulmonary:      Effort: Pulmonary effort is normal.      Breath sounds: Normal breath sounds. No wheezing, rhonchi or rales.   Abdominal:      General: Bowel sounds are normal.      Palpations: Abdomen is soft.      Tenderness: There is no abdominal tenderness.   Musculoskeletal:      Cervical back: Full passive range of motion without pain.      Right lower leg: Edema (painful) present.      Left lower leg: Edema (painful) present.   Lymphadenopathy:      Cervical: No cervical adenopathy.      Upper Body:      Right upper body: No supraclavicular or axillary adenopathy.      Left upper body: No supraclavicular or axillary adenopathy.   Skin:     General: Skin is warm.   Neurological:      General: No focal deficit present.      Mental Status: He is  alert and oriented to person, place, and time.   Psychiatric:         Attention and Perception: Attention normal.         Mood and Affect: Mood and affect normal.         Behavior: Behavior is cooperative.         LABS AND IMAGING REVIEWED IN EPIC          Assessment:   Stage IV pancreatic cancer with biopsy proven liver mets.   Bilateral lower extremity DVTs  Pulmonary embolism        Plan:       I recommended FOLFIRINOX when I 1st saw the patient, he met with MD Chicas on 10/11/2023, they also recommended FOLFIRINOX.    FOLFIRINOX started on 10/17/2023.  I will continue with 10% dose reduction moving forward.    Genetic testing done on 10/31/2023.    Next generation sequencing via [employer] done revealed a KRAS mutation.    Pharmacogenetic testing done showed that he was normal metabolizer of DPYD and UGT1A1.    Patient was diagnosed with bilateral DVTs on 10/03/2023, CT scan of the chest on 10/12/2023 showed PE, he was on Xarelto.    Continue current supportive care.    Okay to proceed with treatment as scheduled next week with continued MVI on pump DC day.    Return to clinic in 2 weeks for TD visit, same day labs        Nick Gutierrez II, MD I, Татьяна Sanches LPN, acted solely as a scribe for and in the presence of, Dr. Nick Gutierrez who performed the service.

## 2023-11-29 NOTE — NURSING
Pt tolerated treatment with no complaints. Painter needle removed from mediport. Site without signs and symptoms of complications. Dressing applied.

## 2023-12-27 NOTE — PROGRESS NOTES
Subjective:       Patient ID: Alonso Andrade is a 68 y.o. male.    Chief Complaint: Follow-up (Patient has concerns about vision loss)      Diagnosis:  Stage IV pancreatic cancer with biopsy proven liver mets.   Bilateral lower extremity DVTs  Pulmonary embolism    Current Treatment:   Planning to start gemcitabine + abraxane day 1 and day 15 (every 2 week dosing)     Treatment History:  FOLFIRINOX started on 10/17/2023    HPI:  68-year-old male who presented to the emergency department at [facility] on 09/07/2023 with difficulty taking a deep breath and some shortness of breath after having gone on a trip.  A CT angiogram of the chest was performed and showed no evidence of pulmonary embolus, however it did mentioned scattered hepatic hypodensities suspicious for metastatic disease.  CT of the abdomen and pelvis was recommended using hepatic mass protocol.  The patient then underwent a CT of the abdomen and pelvis with and without contrast on 09/08/2023 at [facility] that showed a 2.1 cm mass in the tail of the pancreas representing primary pancreatic neoplasm versus metastatic disease with diffuse hepatic metastatic disease seen as well.  The patient was then referred to Dr. Alfonso caldwell, underwent an EUS on 09/28/2023, this revealed multiple metastatic lesions in the left lobe of the liver that were biopsied, there was a mass in the pancreatic tail that was biopsied.  This was ultrasonographically staged as a uT3, N0, M1 stage IV.  Pathology from the biopsies proved poorly differentiated adenocarcinoma with signet ring cells features from the pancreas and also the liver.  The patient was referred to me.  I saw him on 10/03/2023.  At that visit, he stated to have bilateral leg swelling and pain.  He also had some twinges in his abdomen that got better with water.  Bilateral lower extremity ultrasound done on 10/03/2023 showed right and left lower extremity DVT's, started on Xarelto.  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  Pharmacogenetic testing done showed that the patient was a normal metabolizer of DPYD in UGT1A1.  Patient seen at [facility] by Dr. Kathryn Gilbert on 10/11/2023, they agreed with treating with FOLFIRINOX.  CT scan of the chest, abdomen, and pelvis on 10/12/2023 at [facility] showed pulmonary embolism in the right upper lobe, primary mass in the tail of the pancreas, numerous liver metastases, lymph nodes in the abdomen that are probably related to metastatic disease with indeterminate pulmonary nodules.  FOLFIRINOX started on 10/17/2023.  Patient underwent CT scan of the chest, abdomen, and pelvis on 12/05/2023 at [facility], this revealed increase in the pancreatic mass with increased hepatic metastatic disease.  He was seen by Dr. Gilbert at [facility] on 12/06/2023, she recommended transitioning to second-line chemotherapy with gemcitabine and Abraxane.  After long discussion with the patient's family, the patient decided that he wanted to take a naturopathic route.  He started a naturopathic regimen including multiple vitamins in [place], [place].  He re-presented to see me on 12/27/2023.      Interval History:   Patient presents to clinic for scheduled follow up appointment.  He was recently seen at [facility], scans on 12/05/2023 showed progression of disease, visit on 12/06/2023 with the physician, they suggested gemcitabine and Abraxane in the 2nd line.  The patient opted for a naturopathic approach.  He was seen in [place], [place].  He presents today for follow up.  He overall is weak, and he has noticed that he has discomfort with sleeping, jaundice, and weight loss.      Past Medical History:   Diagnosis Date    Abnormal abdominal ultrasound     Abnormal findings on diagnostic imaging of abdomen     Asthma     Cancer     liver and pancreas    Cataracts, bilateral     Diabetes mellitus     DVT femoral (deep venous thrombosis) with  thrombophlebitis, bilateral 10/03/2023    Gallstones     High blood sugar     continuous blood sugar monitoring device    History of prostate cancer     Hyperlipidemia     Hypertension     Left sided abdominal pain     DULCE on CPAP     Other intra-abdominal and pelvic swelling, mass and lump       Past Surgical History:   Procedure Laterality Date    BLEPHAROPLASTY Bilateral 12/05/2022    Procedure: SPLIT CASE - ENDOBROW WITH TINES; BILATERAL UPPER BLEPHS;  Surgeon: Hi Dos Santos MD;  Location: [facility] OR;  Service: ENT;  Laterality: Bilateral;    BROW LIFT N/A 12/05/2022    Procedure: RHYTIDECTOMY, FOREHEAD;  Surgeon: Hi Dos Santos MD;  Location: [facility] OR;  Service: ENT;  Laterality: N/A;    CARDIAC CATHETERIZATION  2018    very minimal blockage    CATARACT EXTRACTION Bilateral     COLONOSCOPY W/ POLYPECTOMY  04/25/2022    [facility]     ENDOSCOPIC ULTRASOUND OF UPPER GASTROINTESTINAL TRACT N/A 09/28/2023    Procedure: UPPER EUS W/  FNA;  Surgeon: Alfonso Ewing MD;  Location: [facility] OR;  Service: Gastroenterology;  Laterality: N/A;    ESOPHAGOGASTRODUODENOSCOPY N/A 09/28/2023    Procedure: EGD (ESOPHAGOGASTRODUODENOSCOPY);  Surgeon: Alfonso Ewing MD;  Location: [facility] OR;  Service: Gastroenterology;  Laterality: N/A;    NASAL SEPTUM SURGERY      PLACEMENT, MEDIPORT N/A 10/06/2023    Procedure: Placement, Mediport;  Surgeon: Renan Gauthier MD;  Location: [facility];  Service: Peripheral Vascular;  Laterality: N/A;    PROSTATECTOMY  2007    SHOULDER ARTHROSCOPY      VASECTOMY       Social History     Socioeconomic History    Marital status:    Tobacco Use    Smoking status: Never    Smokeless tobacco: Never   Substance and Sexual Activity    Alcohol use: Yes     Alcohol/week: 3.0 standard drinks of alcohol     Types: 1 Glasses of wine, 1 Cans of beer, 1 Shots of liquor per week     Comment: once a month    Drug use: Never      Family History   Problem Relation Age of Onset    Colon cancer  Mother     Hypertension Mother     Asthma Mother     Leukemia Father     Hypertension Father     COPD Father     Emphysema Father     Colon cancer Brother 40    Stroke Maternal Grandfather     Diabetes Maternal Grandfather       Review of patient's allergies indicates:  No Known Allergies   Review of Systems   Constitutional:  Positive for fatigue. Negative for appetite change, chills, diaphoresis, fever and unexpected weight change.   HENT:  Negative for nasal congestion, mouth sores, sinus pressure/congestion and sore throat.    Eyes:  Negative for pain and visual disturbance.   Respiratory:  Negative for cough, chest tightness and shortness of breath.    Cardiovascular:  Positive for leg swelling (With pain bilaterally). Negative for chest pain and palpitations.   Gastrointestinal:  Negative for abdominal distention, abdominal pain, blood in stool, constipation and diarrhea.   Endocrine: Positive for cold intolerance.   Genitourinary:  Negative for dysuria, frequency and hematuria.   Musculoskeletal:  Negative for arthralgias and back pain.   Integumentary:  Negative for rash.   Neurological:  Negative for dizziness, weakness, numbness and headaches.        Cold sensitivity secondary to oxaliplatin   Hematological:  Negative for adenopathy.   Psychiatric/Behavioral:  Negative for confusion. The patient is not nervous/anxious.          Objective:      Physical Exam  Vitals reviewed.   Constitutional:       General: He is awake.      Appearance: Normal appearance.   HENT:      Head: Normocephalic and atraumatic.      Right Ear: Hearing normal.      Left Ear: Hearing normal.      Nose: Nose normal.   Eyes:      General: Lids are normal. Vision grossly intact.      Extraocular Movements: Extraocular movements intact.      Conjunctiva/sclera: Conjunctivae normal.   Cardiovascular:      Rate and Rhythm: Normal rate and regular rhythm.      Pulses: Normal pulses.      Heart sounds: Normal heart sounds.   Pulmonary:       Effort: Pulmonary effort is normal.      Breath sounds: Normal breath sounds. No wheezing, rhonchi or rales.   Abdominal:      General: Bowel sounds are normal.      Palpations: Abdomen is soft.      Tenderness: There is no abdominal tenderness.   Musculoskeletal:      Cervical back: Full passive range of motion without pain.      Right lower leg: Edema (painful) present.      Left lower leg: Edema (painful) present.   Lymphadenopathy:      Cervical: No cervical adenopathy.      Upper Body:      Right upper body: No supraclavicular or axillary adenopathy.      Left upper body: No supraclavicular or axillary adenopathy.   Skin:     General: Skin is warm.   Neurological:      General: No focal deficit present.      Mental Status: He is alert and oriented to person, place, and time.   Psychiatric:         Attention and Perception: Attention normal.         Mood and Affect: Mood and affect normal.         Behavior: Behavior is cooperative.         LABS AND IMAGING REVIEWED IN EPIC          Assessment:   Stage IV pancreatic cancer with biopsy proven liver mets.   Bilateral lower extremity DVTs  Pulmonary embolism        Plan:       I recommended FOLFIRINOX when I 1st saw the patient, he met with MD Chicas on 10/11/2023, they also recommended FOLFIRINOX.    Scans on 12/05/2023 showed progression of disease, he visited with a medical oncologist at [facility] on 12/06/2023.  They recommended second-line gemcitabine and Abraxane.  I agree with this approach, treating every 2 weeks.  The patient opted for a naturopathic approach.  He started a program in [place], [place].    I explained to the patient and his family who were present that the naturopathic approach is not data driven, and does not FDA approved.  I explained that we treat based off of evidence based medicine, and there was no evidence that the current regimen that he was on we will treat his cancer.  I explained that the end of the day, it was his choice of how  he would like to proceed.  He voiced understanding.  He stated that the most important thing for him was quality of life.    He asked if I would be okay to still follow him with blood work, I am fine with this.  We will do a CBC, CMP, CA 19 9 today.  He will follow up in 4 weeks with repeat of the same lab.    Next generation sequencing via [employer] done revealed a KRAS mutation.    Continue Xarelto.      Nick Gutierrez II, MD I, Татьяна Sanches LPN, acted solely as a scribe for and in the presence of, Dr. Nick Gutierrez who performed the service.

## (undated) DEVICE — NDL BIOPSY SHARKCORE 22G

## (undated) DEVICE — SOL NORMAL USPCA 0.9%

## (undated) DEVICE — COVER C-ARM STRAP BAND 44X80IN

## (undated) DEVICE — COLLECTION SPECIMEN NEPTUNE

## (undated) DEVICE — ELECTRODE REM PLYHSV RETURN 9

## (undated) DEVICE — PROTECTOR ULNAR NERVE FOAM

## (undated) DEVICE — NDL 27G X 1 1/4

## (undated) DEVICE — NDL SYR 10ML 18X1.5 LL BLUNT

## (undated) DEVICE — TUBING O2 FEMALE CONN 13FT

## (undated) DEVICE — KIT SURGICAL COLON .25 1.1OZ

## (undated) DEVICE — ADHESIVE DERMABOND ADVANCED

## (undated) DEVICE — SUT VICRYL PLUS 3-0 SH 18IN

## (undated) DEVICE — KIT CANIST SUCTION 1200CC

## (undated) DEVICE — NDL HYPO STD REG BVL 25GX1.5IN

## (undated) DEVICE — BLLN ULTRASONIC LINEAR FLEX

## (undated) DEVICE — TIP SUCTION YANKAUER

## (undated) DEVICE — Device

## (undated) DEVICE — SOL IRRI STRL WATER 1000ML

## (undated) DEVICE — COVER PROBE US 5.5X58L NON LTX

## (undated) DEVICE — SUT MCRYL PLUS 4-0 PS2 27IN

## (undated) DEVICE — UNDERPAD DISPOSABLE 30X30IN

## (undated) DEVICE — GLOVE PROTEXIS PI SYN SURG 7.5

## (undated) DEVICE — ELECTRODE BLADE INSULATED 1 IN

## (undated) DEVICE — BAG MEDI-PLAST DECANTER C-FLOW

## (undated) DEVICE — KIT MINI STICK MAX 5F 21GX7CM

## (undated) DEVICE — BLADE SURG STAINLESS STEEL #11

## (undated) DEVICE — CATH CUFF BLLN US RADIAL FLEX